# Patient Record
Sex: MALE | Race: WHITE | NOT HISPANIC OR LATINO | Employment: FULL TIME | URBAN - METROPOLITAN AREA
[De-identification: names, ages, dates, MRNs, and addresses within clinical notes are randomized per-mention and may not be internally consistent; named-entity substitution may affect disease eponyms.]

---

## 2024-09-30 NOTE — PROGRESS NOTES
Physical Therapy  Physical Therapy Orthopedic Evaluation    Patient Name: Jeff Gan  MRN: [id number]  Today's Date: 10/1/2024  Time Calculation  Start Time: 0835  Stop Time: 0929  Time Calculation (min): 54 min  PT Evaluation Time Entry  PT Evaluation (Low) Time Entry: 27  PT Therapeutic Procedures Time Entry  Manual Therapy Time Entry: 9  Therapeutic Exercise Time Entry: 17       Insurance:  Payor: [employer] / Plan: [employer] HE[employer] PLAN / Product Type: *No Product type* /   Number of Treatments Authorized: 1/60          Current Problem  Problem List Items Addressed This Visit             ICD-10-CM       Musculoskeletal and Injuries    Acute pain of left shoulder - Primary M25.512    Relevant Orders    Follow Up In Physical Therapy    Pain, neck M54.2        General:  General  Reason for Referral: L shld pain,L neck pain  Referred By: NAIMA Jc  Past Medical History Relevant to Rehab: high cholesterol    Precautions:   Precautions  STEADI Fall Risk Score (The score of 4 or more indicates an increased risk of falling): 2  Precautions Comment: none    Medical History Form: Reviewed (scanned into chart)    Subjective:   Subjective   Chief Complaint: Patient reports he fell playing golf - was walking down steep hill with golf bag - fell with arms out in front. Has had posterior shld pain since then, which moves to front of shld. Now feeling pain that goes up to neck and has pressure/tightness. Denies N/T. Pt reports he's doing everything but gets pinching with lifting arm out to the side. Pinching with going behind the back as well. Slowly getting better. Denies pain with neck motions. Sleeping well. Golfed last week - hurt a little bit worse following.   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       Pain:  Pain Assessment: 0-10  0-10 (Numeric) Pain Score: 4  Pain Type: Acute pain  Pain Location: Neck  Pain Orientation: Left  Pain Radiating Towards: L post shld/scap/ant shld  Pain Descriptors: Aching, Tender, Sharp      Relevant  Information (PMH & Previous Tests/Imaging): X-ray of cervical spine - arthritis; x-ray shld negative     Prior Level of Function (PLOF)  Patient previously independent with all ADLs  Exercise/Physical Activity: golfs  Work/School: desk work at [employer]     Patients Living Environment: Reviewed and no concern    Primary Language: English    Patient's Goal(s) for Therapy: reduce pain, increase strength / flexibility     Personal factors/comorbidities affecting outcomes: none    Red Flags: Do you have any of the following? No  Fever/chills, unexplained weight changes, dizziness/fainting, unexplained change in bowel or bladder functions, unexplained malaise or muscle weakness, night pain/sweats, numbness or tingling    Objective:  Pt presents with moderately FHP with rounded shoulders    Cervical AROM in degrees:  Flexion 53  Extension 60  Lateral flexion (R,L) 30 pain, 20  Rotation (R,L) 68, 63    Shld AROM (degrees)    Flexion (R,L) 160, 132    Ext (R,L) 74, 61    Abduction (R,L) 145, 145    ER behind head (R,L) T3, T2    IR behind back (R,L) T3, L2    Shld MMT:   R Flexion 4+/5      Abduction 5/5      ER at side 5/5      IR at side 5/5      ER at 90* abduction       IR at 90* abduction       Serratus NT      Mid trap NT      Lower trap NT     L Flexion 4/5 pain      Abduction 4/5 pain      ER at side 4/5 pain      IR at side 4+/5      Serratus NT      Mid trap NT      Lower trap NT    (+) tenderness posterior shld, RTC, L UT/lev scap and lateral cervical mms     Sensation = intact (B) UE    Outcome Measures:  Other Measures  Disability of Arm Shoulder Hand (DASH): 29.55  Neck Disability Index: 12%     EDUCATION: Home exercise program, plan of care, activity modifications, pain management, and injury pathology  Outpatient Education  Individual(s) Educated: Patient  Education Provided: Home Exercise Program, Anatomy, POC  Patient/Caregiver Demonstrated Understanding: yes  Plan of Care Discussed and Agreed Upon:  yes  Patient Response to Education: Patient/Caregiver Verbalized Understanding of Information  Education Comment: Access Code: [email or web address]  URL: [email or web address]  Date: 10/01/2024  Prepared by: Tonya Bell    Exercises  - Supine Chin Tuck  - 3 x daily - 7 x weekly - 1 sets - 10 reps - 3 sec hold  - Seated Scapular Retraction  - 2 x daily - 7 x weekly - 1 sets - 10 reps - 2 hold  - Supine Shoulder Flexion Extension Full Range AROM  - 2 x daily - 7 x weekly - 1 sets - 10 reps  - Seated Shoulder External Rotation AAROM with Dowel  - 2 x daily - 7 x weekly - 1-2 sets - 10 reps  - Standing Shoulder Abduction AAROM with Dowel  - 2 x daily - 7 x weekly - 2 sets - 10 reps    Treatment Performed:  Therapeutic Exercise  Therapeutic Exercise Performed: Yes  Therapeutic Exercise Activity 1: Seated posture at work  Therapeutic Exercise Activity 2: HEP performed 1x through  Therapeutic Exercise Activity 3: Cervical retractions - limited - painful to perform - chin tucks without pain    Manual Therapy  Manual Therapy Performed: Yes  Manual Therapy Activity 1: cervical distraction - relief  Manual Therapy Activity 2: lateral cervical mm strumming L, L lev scap/UT release    Assessment: Patient is 49 year old who presents to physical therapy with signs and symptoms consistent with L shoulder pain and subsequent L cervicalgia. Patient has decreased shld ROM with associated pain and strength deficits limiting functional mobility and ADLs. Pt would benefit from skilled physical therapy in order to address the stated deficits and return to daily tasks with reduced pain and improved function.    Clinical Presentation: Stable and/or uncomplicated characteristics  Personal Factors: None    Plan:  Treatment/Interventions: Cryotherapy, Dry needling, Education/ Instruction, Electrical stimulation, Gait training, Hot pack, Manual therapy, Neuromuscular re-education, Self care/ home management, Taping techniques,  Therapeutic activities, Therapeutic exercises, Vasopneumatic device  PT Plan: Skilled PT (response to chin tucks, postural re ed, L shld mobility and strengthening)  PT Frequency: 2 times per week  Duration: 8 weeks, reducing frequency as goals are met  Onset Date: 08/14/24  Number of Treatments Authorized: 1/60  Rehab Potential: Good  Plan of Care Agreement: Patient       Screening  Frequency  Date Last Completed   Spiritual and Cultural Beliefs   Screening each visit or episode of care 10/1/2024   Falls Risk Screening every ambulatory visit [unfilled]   Pain Screening annually at primary care visit     Domestic Violence screening annually at primary care visit    Depression Screening annually in the primary care setting 10/1/2024   Suicide Risk Screening annually in the primary care setting    Nutrition and Food Insecurity   Screening at least annually at primary care visit     Key Learner annually in the primary care setting 10/1/2024   Drug Screen     Alcohol Screen     Advance Directive         Goals: Set and discussed today  Active       L shld pain/cervicalgia       STG/LTG       Start:  10/01/24    Expected End:  11/26/24       STG  1) Patient will improve Quick Dash score by 8% to show an improvement in function in 4 weeks.  2) Patient will show an improvement in shoulder flex/abd to 160 AROM without pain in order to allow for improved ability to perform household chores in 4 weeks.  3) Patient will be able to perform ADLs without pain exceeding 3/10 on the VAS in 4 weeks.  4) Patient will be independent with HEP in 3 visits in order to allow for improved function with home and community tasks.  LTG  1) Patient will improve Quick Dash score to </=15% to show an improvement in function in 8 weeks.  2) Patient will improve scapular and shld strength to 4+/5 in order to reduce compensatory guarding in upper trapezius and greater functional use of upper extremities in 8 weeks.   3) Patient will be able to  perform ADLs without pain exceeding 2/10 on the VAS in 8 weeks.  4) Patient will return to golf/exercise without deficits to improve QOL in 8 weeks.               Plan of care was developed with input and agreement by the patient      Tonya Bell PT

## 2024-10-01 ENCOUNTER — EVALUATION (OUTPATIENT)
Dept: PHYSICAL THERAPY | Facility: CLINIC | Age: 49
End: 2024-10-01
Payer: COMMERCIAL

## 2024-10-01 DIAGNOSIS — M54.2 PAIN, NECK: ICD-10-CM

## 2024-10-01 DIAGNOSIS — M25.512 ACUTE PAIN OF LEFT SHOULDER: Primary | ICD-10-CM

## 2024-10-01 PROCEDURE — 97110 THERAPEUTIC EXERCISES: CPT | Mod: GP | Performed by: PHYSICAL THERAPIST

## 2024-10-01 PROCEDURE — 97140 MANUAL THERAPY 1/> REGIONS: CPT | Mod: GP | Performed by: PHYSICAL THERAPIST

## 2024-10-01 PROCEDURE — 97161 PT EVAL LOW COMPLEX 20 MIN: CPT | Mod: GP | Performed by: PHYSICAL THERAPIST

## 2024-10-01 ASSESSMENT — PAIN - FUNCTIONAL ASSESSMENT: PAIN_FUNCTIONAL_ASSESSMENT: 0-10

## 2024-10-01 ASSESSMENT — ENCOUNTER SYMPTOMS
DEPRESSION: 0
LOSS OF SENSATION IN FEET: 0
OCCASIONAL FEELINGS OF UNSTEADINESS: 0

## 2024-10-01 ASSESSMENT — PAIN DESCRIPTION - DESCRIPTORS: DESCRIPTORS: ACHING;TENDER;SHARP

## 2024-10-01 ASSESSMENT — PATIENT HEALTH QUESTIONNAIRE - PHQ9
2. FEELING DOWN, DEPRESSED OR HOPELESS: NOT AT ALL
1. LITTLE INTEREST OR PLEASURE IN DOING THINGS: NOT AT ALL
SUM OF ALL RESPONSES TO PHQ9 QUESTIONS 1 AND 2: 0

## 2024-10-01 ASSESSMENT — PAIN SCALES - GENERAL: PAINLEVEL_OUTOF10: 4

## 2024-10-07 NOTE — PROGRESS NOTES
Physical Therapy Treatment    Patient Name: Jeff Gan  MRN: [id number]  Today's Date: 10/8/2024  Time Calculation  Start Time: 0920  Stop Time: 0959  Time Calculation (min): 39 min  PT Therapeutic Procedures Time Entry  Manual Therapy Time Entry: 14  Therapeutic Exercise Time Entry: 25       Current Problem  Problem List Items Addressed This Visit             ICD-10-CM       Musculoskeletal and Injuries    Acute pain of left shoulder M25.512    Pain, neck - Primary M54.2       Insurance:  Number of Treatments Authorized: 2/60          Subjective   General  Reason for Referral: L shld pain, L neck pain  Referred By: NAIMA Jc  Past Medical History Relevant to Rehab: high cholesterol  General Comment: Pt reports he feels about the same as IE. Trying to do exercises - doing at least 1x/week.    Performing HEP?: Yes    Precautions  Precautions  STEADI Fall Risk Score (The score of 4 or more indicates an increased risk of falling): 2  Precautions Comment: none  Pain  Pain Assessment: 0-10  0-10 (Numeric) Pain Score: 3  Pain Type: Acute pain  Pain Location: Neck  Pain Orientation: Left  Pain Radiating Towards: L post shld/scap/ant shld  Pain Descriptors: Aching    Objective    (+) L lev scap TP       Treatments:  Therapeutic Exercise  Therapeutic Exercise Activity 1: Seated posture with subjective review; cervical retractions x 10 reps - mild discomfort  Therapeutic Exercise Activity 2: OTD pulleys flexion x 2 minutes  Therapeutic Exercise Activity 3: Seated B bkwd rolls x 1 min  Therapeutic Exercise Activity 4: Seated scap retraction x 10 reps  Therapeutic Exercise Activity 5: OTD pulleys scaption x 2 min  Therapeutic Exercise Activity 6: Seated cervical retractions x 10 reps (Mild headache feeling)  Therapeutic Exercise Activity 7: OTD pulleys abduction x 2 min  Therapeutic Exercise Activity 8: Supine chin tucks x 15 reps    Manual Therapy  Manual Therapy Activity 1: cervical distraction - relief  Manual Therapy  Activity 2: lateral cervical mm strumming L, L lev scap/UT release  Manual Therapy Activity 3: L biceps strumming, delt    OP EDUCATION:  Outpatient Education  Education Comment: Continue current HEP    Assessment:  PT Assessment  Assessment Comment: Pt tolerating tx session well with minimal discomfort with pulleys and exercise regimen. Mild discomfort in neck with seated retractions - pt to continue with supine chin tucks unless able to tolerate seated without increased pain levels. TP along L lev scap reproducing familiar symptoms, improving with STM, warned on post tx soreness.    Plan:  OP PT Plan  Treatment/Interventions: Cryotherapy, Dry needling, Education/ Instruction, Electrical stimulation, Gait training, Hot pack, Manual therapy, Neuromuscular re-education, Self care/ home management, Taping techniques, Therapeutic activities, Therapeutic exercises, Vasopneumatic device  PT Plan: Skilled PT (response to chin tucks, postural re ed, L shld mobility and strengthening)  PT Frequency: 2 times per week  Duration: 8 weeks, reducing frequency as goals are met  Onset Date: 08/14/24  Number of Treatments Authorized: 2/60  Rehab Potential: Good  Plan of Care Agreement: Patient    Goals:  Active       L shld pain/cervicalgia       STG/LTG       Start:  10/01/24    Expected End:  11/26/24       STG  1) Patient will improve Quick Dash score by 8% to show an improvement in function in 4 weeks.  2) Patient will show an improvement in shoulder flex/abd to 160 AROM without pain in order to allow for improved ability to perform household chores in 4 weeks.  3) Patient will be able to perform ADLs without pain exceeding 3/10 on the VAS in 4 weeks.  4) Patient will be independent with HEP in 3 visits in order to allow for improved function with home and community tasks.  LTG  1) Patient will improve Quick Dash score to </=15% to show an improvement in function in 8 weeks.  2) Patient will improve scapular and shld strength  to 4+/5 in order to reduce compensatory guarding in upper trapezius and greater functional use of upper extremities in 8 weeks.   3) Patient will be able to perform ADLs without pain exceeding 2/10 on the VAS in 8 weeks.  4) Patient will return to golf/exercise without deficits to improve QOL in 8 weeks.                Tonya Bell, PT

## 2024-10-08 ENCOUNTER — TREATMENT (OUTPATIENT)
Dept: PHYSICAL THERAPY | Facility: CLINIC | Age: 49
End: 2024-10-08
Payer: COMMERCIAL

## 2024-10-08 DIAGNOSIS — M54.2 PAIN, NECK: Primary | ICD-10-CM

## 2024-10-08 DIAGNOSIS — M25.512 ACUTE PAIN OF LEFT SHOULDER: ICD-10-CM

## 2024-10-08 PROCEDURE — 97110 THERAPEUTIC EXERCISES: CPT | Mod: GP | Performed by: PHYSICAL THERAPIST

## 2024-10-08 PROCEDURE — 97140 MANUAL THERAPY 1/> REGIONS: CPT | Mod: GP | Performed by: PHYSICAL THERAPIST

## 2024-10-08 ASSESSMENT — PAIN SCALES - GENERAL: PAINLEVEL_OUTOF10: 3

## 2024-10-08 ASSESSMENT — PAIN - FUNCTIONAL ASSESSMENT: PAIN_FUNCTIONAL_ASSESSMENT: 0-10

## 2024-10-08 ASSESSMENT — PAIN DESCRIPTION - DESCRIPTORS: DESCRIPTORS: ACHING

## 2024-10-11 ENCOUNTER — TREATMENT (OUTPATIENT)
Dept: PHYSICAL THERAPY | Facility: CLINIC | Age: 49
End: 2024-10-11
Payer: COMMERCIAL

## 2024-10-11 DIAGNOSIS — M25.512 ACUTE PAIN OF LEFT SHOULDER: ICD-10-CM

## 2024-10-11 PROCEDURE — 97110 THERAPEUTIC EXERCISES: CPT | Mod: GP | Performed by: PHYSICAL THERAPIST

## 2024-10-11 PROCEDURE — 97140 MANUAL THERAPY 1/> REGIONS: CPT | Mod: GP | Performed by: PHYSICAL THERAPIST

## 2024-10-11 ASSESSMENT — PAIN SCALES - GENERAL: PAINLEVEL_OUTOF10: 2

## 2024-10-11 ASSESSMENT — PAIN - FUNCTIONAL ASSESSMENT: PAIN_FUNCTIONAL_ASSESSMENT: 0-10

## 2024-10-11 NOTE — PROGRESS NOTES
"  Physical Therapy Treatment    Patient Name: Jeff Gan  MRN: [id number]  Today's Date: 10/11/2024  Time Calculation  Start Time: 0900  Stop Time: 0944  Time Calculation (min): 44 min  PT Therapeutic Procedures Time Entry  Manual Therapy Time Entry: 13  Therapeutic Exercise Time Entry: 30       Current Problem  Problem List Items Addressed This Visit             ICD-10-CM       Musculoskeletal and Injuries    Acute pain of left shoulder M25.512       Insurance:  Number of Treatments Authorized: 3/60          Subjective   General  Reason for Referral: L shld pain, L neck pain  Referred By: NAIMA Jc  Past Medical History Relevant to Rehab: high cholesterol  General Comment: Pt reports he's feeling about the same still with his neck/shld, did exercises 1x yesterday. Is noted some LBP to R radiating pain from laying on the ground with kitten.    Performing HEP?: Yes    Precautions  Precautions  STEADI Fall Risk Score (The score of 4 or more indicates an increased risk of falling): 2  Precautions Comment: none  Pain  Pain Assessment: 0-10  0-10 (Numeric) Pain Score: 2  Pain Type: Acute pain  Pain Location: Neck  Pain Orientation: Left    Objective    Minimal loss into retraction (improved from last session)  (+) TP at L biceps      Treatments:  Therapeutic Exercise  Therapeutic Exercise Activity 1: Seated lumbar roll x 3 minutes  Therapeutic Exercise Activity 2: OTD pulleys flexion x 2 minutes  Therapeutic Exercise Activity 3: Seated scap retraction x 10 reps  Therapeutic Exercise Activity 4: Shld rolls painful x 5 - held  Therapeutic Exercise Activity 5: OTD pulleys scaption x 2 min  Therapeutic Exercise Activity 6: Seated cervical retractions x 1 min (educated on HEP)  Therapeutic Exercise Activity 7: Seated bkwd shld rolls x 10  Therapeutic Exercise Activity 8: OTD pulleys abduction x 2 min  Therapeutic Exercise Activity 9: L shld ER/IR isos at wall 3\" x 1 min each  Therapeutic Exercise Activity 10: Supine chin " tucks x 1 min  Therapeutic Exercise Activity 11: Supine L shld AROM flexion x 1 min    Manual Therapy  Manual Therapy Activity 1: cervical distraction with SOR  Manual Therapy Activity 2: lateral cervical mm strumming L, L lev scap/UT release  Manual Therapy Activity 3: L biceps strumming, delt    OP EDUCATION:  Outpatient Education  Education Comment: Access Code: [email or web address]  URL: [email or web address]  Date: 10/11/2024  Prepared by: Tonya Bell    Exercises  - Seated Cervical Retraction  - 5 x daily - 7 x weekly - 1 sets - 10 reps  - Supine Cervical Retraction with Towel  - 2 x daily - 7 x weekly - 1 sets - 10 reps  - Isometric Shoulder External Rotation at Wall  - 2 x daily - 7 x weekly - 1 sets - 10 reps - 3-5 sec hold  - Standing Isometric Shoulder Internal Rotation at Doorway  - 2 x daily - 7 x weekly - 1 sets - 10 reps - 3-5 sec hold  - Supine Shoulder Flexion Extension Full Range AROM  - 2 x daily - 7 x weekly - 2 sets - 10 reps    Assessment:  PT Assessment  Assessment Comment: Pt with slow initial progress under POC subjectively - however is showing improved neck and shld mobility. Coaxed for increasing reps for cervical retractions along with initiating shld isos and holding ER/abd AAROM at this time. Also educated on lumbar roll with good understanding. Pt with pain relief following manual - warned on post tx soreness.    Plan:  OP PT Plan  Treatment/Interventions: Cryotherapy, Dry needling, Education/ Instruction, Electrical stimulation, Gait training, Hot pack, Manual therapy, Neuromuscular re-education, Self care/ home management, Taping techniques, Therapeutic activities, Therapeutic exercises, Vasopneumatic device  PT Plan: Skilled PT (response to chin tucks, postural re ed, L shld mobility and strengthening)  PT Frequency: 2 times per week  Duration: 8 weeks, reducing frequency as goals are met  Onset Date: 08/14/24  Number of Treatments Authorized: 3/60  Rehab Potential:  Good  Plan of Care Agreement: Patient    Goals:  Active       L shld pain/cervicalgia       STG/LTG       Start:  10/01/24    Expected End:  11/26/24       STG  1) Patient will improve Quick Dash score by 8% to show an improvement in function in 4 weeks.  2) Patient will show an improvement in shoulder flex/abd to 160 AROM without pain in order to allow for improved ability to perform household chores in 4 weeks.  3) Patient will be able to perform ADLs without pain exceeding 3/10 on the VAS in 4 weeks.  4) Patient will be independent with HEP in 3 visits in order to allow for improved function with home and community tasks.  LTG  1) Patient will improve Quick Dash score to </=15% to show an improvement in function in 8 weeks.  2) Patient will improve scapular and shld strength to 4+/5 in order to reduce compensatory guarding in upper trapezius and greater functional use of upper extremities in 8 weeks.   3) Patient will be able to perform ADLs without pain exceeding 2/10 on the VAS in 8 weeks.  4) Patient will return to golf/exercise without deficits to improve QOL in 8 weeks.                Tonya Bell, PT

## 2024-10-15 ENCOUNTER — TREATMENT (OUTPATIENT)
Dept: PHYSICAL THERAPY | Facility: CLINIC | Age: 49
End: 2024-10-15
Payer: COMMERCIAL

## 2024-10-15 DIAGNOSIS — M25.512 ACUTE PAIN OF LEFT SHOULDER: ICD-10-CM

## 2024-10-15 PROCEDURE — 97110 THERAPEUTIC EXERCISES: CPT | Mod: GP | Performed by: PHYSICAL THERAPIST

## 2024-10-15 PROCEDURE — 97140 MANUAL THERAPY 1/> REGIONS: CPT | Mod: GP | Performed by: PHYSICAL THERAPIST

## 2024-10-15 ASSESSMENT — PAIN - FUNCTIONAL ASSESSMENT: PAIN_FUNCTIONAL_ASSESSMENT: 0-10

## 2024-10-15 ASSESSMENT — PAIN SCALES - GENERAL: PAINLEVEL_OUTOF10: 3

## 2024-10-15 NOTE — PROGRESS NOTES
Physical Therapy Treatment    Patient Name: Jeff Gan  MRN: [id number]  Today's Date: 10/15/2024  Time Calculation  Start Time: 1230  Stop Time: 1313  Time Calculation (min): 43 min  PT Therapeutic Procedures Time Entry  Manual Therapy Time Entry: 12  Therapeutic Exercise Time Entry: 30       Current Problem  Problem List Items Addressed This Visit             ICD-10-CM       Musculoskeletal and Injuries    Acute pain of left shoulder M25.512       Insurance:  Number of Treatments Authorized: 4/60          Subjective   General  Reason for Referral: L shld pain, L sided neck pain  Referred By: NAIMA Jc  Past Medical History Relevant to Rehab: high cholesterol  General Comment: Pt reports he thinks he's feeling a little bit better. Did get sharp pain in shoulder with lifting arm out to the side quickly. Neck seems like it's getting a little better.    Performing HEP?: Yes    Precautions  Precautions  STEADI Fall Risk Score (The score of 4 or more indicates an increased risk of falling): 2  Precautions Comment: none  Pain  Pain Assessment: 0-10  0-10 (Numeric) Pain Score: 3  Pain Type: Acute pain  Pain Location: Shoulder  Pain Orientation: Left    Objective    OTD pulleys full L shoulder mobility overhead      Treatments:  Therapeutic Exercise  Therapeutic Exercise Activity 1: Seated with subjective  Therapeutic Exercise Activity 2: OTD pulleys flexion x 2 minutes  Therapeutic Exercise Activity 3: Seated L shoulder AROM flexion x 1 min  Therapeutic Exercise Activity 4: B shoulder ER palms up x 1 min seated  Therapeutic Exercise Activity 5: OTD pulleys scaption x 2 min  Therapeutic Exercise Activity 6: Seated cervical retractions x 1 min  Therapeutic Exercise Activity 7: B shld flexion wall slides x 1 min  Therapeutic Exercise Activity 8: L shld scaption wall slides x 5 reps - painful, R UE pain free  Therapeutic Exercise Activity 9: B rows yellow perform better band x 1 min  Therapeutic Exercise Activity 10:  R/L alt shld extension yellow perform better band x 1 min  Therapeutic Exercise Activity 11: B mid rows to chest yellow perform better band x 1 min  Therapeutic Exercise Activity 12: R/L shld flexion crawl up ball red swiss ball with stretch x 1 min    Manual Therapy  Manual Therapy Activity 1: cervical distraction with SOR  Manual Therapy Activity 2: lateral cervical mm strumming L, L lev scap/UT release  Manual Therapy Activity 3: L biceps strumming, delt    OP EDUCATION:  Outpatient Education  Education Comment: Current HEP - bands next visit if able    Assessment:  PT Assessment  Assessment Comment: Pt showing good progress with L shld mobility with slightly reduced pain this date. Greater tolerance to exercise with ability to add resisted banded strength without an increase in pain. Tenderness persists at L UT and biceps region, improving with STM. Warned on post tx soreness.    Plan:  OP PT Plan  Treatment/Interventions: Cryotherapy, Dry needling, Education/ Instruction, Electrical stimulation, Gait training, Hot pack, Manual therapy, Neuromuscular re-education, Self care/ home management, Taping techniques, Therapeutic activities, Therapeutic exercises, Vasopneumatic device  PT Plan: Skilled PT (response to chin tucks, postural re ed, L shld mobility and scap strengthening)  PT Frequency: 2 times per week  Duration: 8 weeks, reducing frequency as goals are met  Onset Date: 08/14/24  Number of Treatments Authorized: 4/60  Rehab Potential: Good  Plan of Care Agreement: Patient    Goals:  Active       L shld pain/cervicalgia       STG/LTG       Start:  10/01/24    Expected End:  11/26/24       STG  1) Patient will improve Quick Dash score by 8% to show an improvement in function in 4 weeks.  2) Patient will show an improvement in shoulder flex/abd to 160 AROM without pain in order to allow for improved ability to perform household chores in 4 weeks.  3) Patient will be able to perform ADLs without pain exceeding  3/10 on the VAS in 4 weeks.  4) Patient will be independent with HEP in 3 visits in order to allow for improved function with home and community tasks.  LTG  1) Patient will improve Quick Dash score to </=15% to show an improvement in function in 8 weeks.  2) Patient will improve scapular and shld strength to 4+/5 in order to reduce compensatory guarding in upper trapezius and greater functional use of upper extremities in 8 weeks.   3) Patient will be able to perform ADLs without pain exceeding 2/10 on the VAS in 8 weeks.  4) Patient will return to golf/exercise without deficits to improve QOL in 8 weeks.                Tonya Bell, PT

## 2024-10-18 ENCOUNTER — TREATMENT (OUTPATIENT)
Dept: PHYSICAL THERAPY | Facility: CLINIC | Age: 49
End: 2024-10-18
Payer: COMMERCIAL

## 2024-10-18 DIAGNOSIS — M25.512 ACUTE PAIN OF LEFT SHOULDER: ICD-10-CM

## 2024-10-18 DIAGNOSIS — M54.2 PAIN, NECK: Primary | ICD-10-CM

## 2024-10-18 PROCEDURE — 97140 MANUAL THERAPY 1/> REGIONS: CPT | Mod: GP | Performed by: PHYSICAL THERAPIST

## 2024-10-18 PROCEDURE — 97110 THERAPEUTIC EXERCISES: CPT | Mod: GP | Performed by: PHYSICAL THERAPIST

## 2024-10-18 ASSESSMENT — PAIN SCALES - GENERAL: PAINLEVEL_OUTOF10: 2

## 2024-10-18 ASSESSMENT — PAIN - FUNCTIONAL ASSESSMENT: PAIN_FUNCTIONAL_ASSESSMENT: 0-10

## 2024-10-18 NOTE — PROGRESS NOTES
Physical Therapy Treatment    Patient Name: Jeff Gan  MRN: [id number]  Today's Date: 10/18/2024  Time Calculation  Start Time: 0901  Stop Time: 0948  Time Calculation (min): 47 min  PT Therapeutic Procedures Time Entry  Manual Therapy Time Entry: 9  Therapeutic Exercise Time Entry: 36       Current Problem  Problem List Items Addressed This Visit             ICD-10-CM       Musculoskeletal and Injuries    Acute pain of left shoulder M25.512    Pain, neck - Primary M54.2       Insurance:  Number of Treatments Authorized: 5/60          Subjective   General  Reason for Referral: L shld pain, L sided neck pain  Referred By: NAIMA Jc  Past Medical History Relevant to Rehab: high cholesterol  General Comment: Pt reports he's doing a little better overall. Neck has been feeling less tense. Notes he's still getting sharp pains when lifting L shoulder quickly still. Did do some shld presses with light weights and shld abd yesterday.    Performing HEP?: Yes    Precautions  Precautions  STEADI Fall Risk Score (The score of 4 or more indicates an increased risk of falling): 2  Precautions Comment: none  Pain  Pain Assessment: 0-10  0-10 (Numeric) Pain Score: 2  Pain Type: Acute pain  Pain Location: Shoulder  Pain Orientation: Left    Objective    L shld flexion 144  L IR behind back L2    Treatments:  Therapeutic Exercise  Therapeutic Exercise Activity 1: Seated with subjective  Therapeutic Exercise Activity 2: SciFit UBE 2'/2' F/B x 4 minutes  Therapeutic Exercise Activity 3: Seated thoracic ext over chair x 1 min  Therapeutic Exercise Activity 4: Doorway stretch low 30 sec x 2 (pain with arms higher - held)  Therapeutic Exercise Activity 5: B shld flexion wall slides x 1 min  Therapeutic Exercise Activity 6: B shld scaption to 90* x 10 reps  Therapeutic Exercise Activity 7: L shld abduction x 10 reps  Therapeutic Exercise Activity 8: L shld flexion x 5 reps  Therapeutic Exercise Activity 9: L lev scap stretch  "standing 15\" x 3 reps  Therapeutic Exercise Activity 10: L UT stretch 10\" x 2  Therapeutic Exercise Activity 11: B scap retraction with ER x 1 min  Therapeutic Exercise Activity 12: B rows red tband x 1 min  Therapeutic Exercise Activity 13: R/L alt shld extension red tband x 1 min    Manual Therapy  Manual Therapy Activity 1: lateral cervical mm strumming L, L lev scap/UT release  Manual Therapy Activity 2: L biceps strumming, delt  Manual Therapy Activity 3: Cervical distraction, L GHJ mobs grade II/III    OP EDUCATION:  Outpatient Education  Education Comment: Access Code: [email or web address]  URL: [email or web address]  Date: 10/18/2024  Prepared by: Tonya Bell    Exercises  - Standing Shoulder Row with Anchored Resistance  - 1 x daily - 3-5 x weekly - 2 sets - 10 reps  - Shoulder extension with resistance - Neutral  - 1 x daily - 3-5 x weekly - 2 sets - 10 reps  - Shoulder External Rotation and Scapular Retraction  - 1 x daily - 3-5 x weekly - 2 sets - 10 reps    Assessment:  PT Assessment  Assessment Comment: Pt showing good progress under POC with improved shoulder mobility with lesser amts of pain, however increased education needed on healing process, anatomy with elevation with shld ER (thumb up) and mvmts to avoid. Able to add banded strength this date without an increase in symptoms, updated and added to HEP. Pt also educated heavily on reducing UT work with shld exercises with good understanding. Relief with STM primarily in neck region.    Plan:  OP PT Plan  Treatment/Interventions: Cryotherapy, Dry needling, Education/ Instruction, Electrical stimulation, Gait training, Hot pack, Manual therapy, Neuromuscular re-education, Self care/ home management, Taping techniques, Therapeutic activities, Therapeutic exercises, Vasopneumatic device  PT Plan: Skilled PT (response to chin tucks, postural re ed, L shld mobility and scap strengthening)  PT Frequency: 2 times per week  Duration: 8 weeks, " reducing frequency as goals are met  Onset Date: 08/14/24  Number of Treatments Authorized: 5/60  Rehab Potential: Good  Plan of Care Agreement: Patient    Goals:  Active       L shld pain/cervicalgia       STG/LTG (Progressing)       Start:  10/01/24    Expected End:  11/26/24       STG  1) Patient will improve Quick Dash score by 8% to show an improvement in function in 4 weeks.  2) Patient will show an improvement in shoulder flex/abd to 160 AROM without pain in order to allow for improved ability to perform household chores in 4 weeks.  3) Patient will be able to perform ADLs without pain exceeding 3/10 on the VAS in 4 weeks.  4) Patient will be independent with HEP in 3 visits in order to allow for improved function with home and community tasks.  LTG  1) Patient will improve Quick Dash score to </=15% to show an improvement in function in 8 weeks.  2) Patient will improve scapular and shld strength to 4+/5 in order to reduce compensatory guarding in upper trapezius and greater functional use of upper extremities in 8 weeks.   3) Patient will be able to perform ADLs without pain exceeding 2/10 on the VAS in 8 weeks.  4) Patient will return to golf/exercise without deficits to improve QOL in 8 weeks.                Tonya Bell, PT

## 2024-10-22 ENCOUNTER — TREATMENT (OUTPATIENT)
Dept: PHYSICAL THERAPY | Facility: CLINIC | Age: 49
End: 2024-10-22
Payer: COMMERCIAL

## 2024-10-22 DIAGNOSIS — M25.512 ACUTE PAIN OF LEFT SHOULDER: Primary | ICD-10-CM

## 2024-10-22 DIAGNOSIS — M54.2 PAIN, NECK: ICD-10-CM

## 2024-10-22 PROCEDURE — 97110 THERAPEUTIC EXERCISES: CPT | Mod: GP,CQ

## 2024-10-22 PROCEDURE — 97140 MANUAL THERAPY 1/> REGIONS: CPT | Mod: GP,CQ

## 2024-10-22 ASSESSMENT — PAIN SCALES - GENERAL: PAINLEVEL_OUTOF10: 3

## 2024-10-22 ASSESSMENT — PAIN - FUNCTIONAL ASSESSMENT: PAIN_FUNCTIONAL_ASSESSMENT: 0-10

## 2024-10-22 NOTE — PROGRESS NOTES
"  Physical Therapy Treatment    Patient Name: Jeff Gan  MRN: [id number]  Today's Date: 10/22/2024  Time Calculation  Start Time: 0830  Stop Time: 0917  Time Calculation (min): 47 min  PT Therapeutic Procedures Time Entry  Manual Therapy Time Entry: 15  Therapeutic Exercise Time Entry: 32,      Current Problem  Problem List Items Addressed This Visit             ICD-10-CM    Acute pain of left shoulder - Primary M25.512    Pain, neck M54.2       Insurance:  Number of Treatments Authorized: 6/60            Subjective   General  Reason for Referral: L shld pain, L sided neck pain  Referred By: NAIMA Jc  Past Medical History Relevant to Rehab: high cholesterol  General Comment: Sunday was the best day he's had in awhile.  Yesterday he was doing well, until the end of the day when he tried to put on his shirt \"normally\".  Felt a little tweak and has some mild soreness this morning.  Neck if getting looser.    Performing HEP?: Yes    Precautions  Precautions  STEADI Fall Risk Score (The score of 4 or more indicates an increased risk of falling): 2  Precautions Comment: none  Pain  Pain Assessment: 0-10  0-10 (Numeric) Pain Score: 3  Pain Type: Acute pain  Pain Location: Shoulder  Pain Orientation: Left    Objective       Treatments:    Therapeutic Exercise  Therapeutic Exercise Activity 1: SciFit UBE 2'/2' F/B x 4 minutes  Therapeutic Exercise Activity 2: thoracic extension over chair with hands clasped x 10  Therapeutic Exercise Activity 3: Doorway stretch low 30 sec x 2 (pain with arms higher - held)  Therapeutic Exercise Activity 4: B shld flexion wall slides x 1 min  Therapeutic Exercise Activity 5: retro shld rolls x 10  Therapeutic Exercise Activity 6: scap add with PVC assisted B shld ext x 1 min  Therapeutic Exercise Activity 7: posture on the wall - B ER x 1 min  Therapeutic Exercise Activity 8: PVC assisted scaption x 1 min  Therapeutic Exercise Activity 9: RTB resisted reactive ER 2 step x " 10  Therapeutic Exercise Activity 10: RTB resisted reactive IR 2 step x 10  Therapeutic Exercise Activity 11: supine RTB H ABD x 1 min         Manual Therapy  Manual Therapy Activity 1: STM/DSTM L UT, LEV, med scap boarder, infra, pecs, delt, bicep  Manual Therapy Activity 2: SOR, manual cervical distraction  Manual Therapy Activity 3: stretch L UT, LEV, pecs                     OP EDUCATION:       Assessment:  PT Assessment  Assessment Comment: Patient tolerated today's session well without the pinching pain in his shoulder.  He was pleased with his ability to tolerate some of the new exercises.  Good relief with manual activities.    Plan:  OP PT Plan  Treatment/Interventions: Cryotherapy, Dry needling, Education/ Instruction, Electrical stimulation, Gait training, Hot pack, Manual therapy, Neuromuscular re-education, Self care/ home management, Taping techniques, Therapeutic activities, Therapeutic exercises, Vasopneumatic device  PT Plan: Skilled PT (response to chin tucks, postural re ed, L shld mobility and scap strengthening)  PT Frequency: 2 times per week  Duration: 8 weeks, reducing frequency as goals are met  Onset Date: 08/14/24  Number of Treatments Authorized: 6/60  Rehab Potential: Good  Plan of Care Agreement: Patient    Goals:  Active       L shld pain/cervicalgia       STG/LTG (Progressing)       Start:  10/01/24    Expected End:  11/26/24       STG  1) Patient will improve Quick Dash score by 8% to show an improvement in function in 4 weeks.  2) Patient will show an improvement in shoulder flex/abd to 160 AROM without pain in order to allow for improved ability to perform household chores in 4 weeks.  3) Patient will be able to perform ADLs without pain exceeding 3/10 on the VAS in 4 weeks.  4) Patient will be independent with HEP in 3 visits in order to allow for improved function with home and community tasks.  LTG  1) Patient will improve Quick Dash score to </=15% to show an improvement in  function in 8 weeks.  2) Patient will improve scapular and shld strength to 4+/5 in order to reduce compensatory guarding in upper trapezius and greater functional use of upper extremities in 8 weeks.   3) Patient will be able to perform ADLs without pain exceeding 2/10 on the VAS in 8 weeks.  4) Patient will return to golf/exercise without deficits to improve QOL in 8 weeks.                Marimar Perez, PTA

## 2024-10-29 ENCOUNTER — TREATMENT (OUTPATIENT)
Dept: PHYSICAL THERAPY | Facility: CLINIC | Age: 49
End: 2024-10-29
Payer: COMMERCIAL

## 2024-10-29 DIAGNOSIS — M25.512 ACUTE PAIN OF LEFT SHOULDER: Primary | ICD-10-CM

## 2024-10-29 DIAGNOSIS — M54.2 PAIN, NECK: ICD-10-CM

## 2024-10-29 PROCEDURE — 97110 THERAPEUTIC EXERCISES: CPT | Mod: GP | Performed by: PHYSICAL THERAPIST

## 2024-10-29 PROCEDURE — 97140 MANUAL THERAPY 1/> REGIONS: CPT | Mod: GP | Performed by: PHYSICAL THERAPIST

## 2024-10-29 ASSESSMENT — PAIN - FUNCTIONAL ASSESSMENT: PAIN_FUNCTIONAL_ASSESSMENT: 0-10

## 2024-10-29 ASSESSMENT — PAIN SCALES - GENERAL: PAINLEVEL_OUTOF10: 2

## 2024-11-05 ENCOUNTER — TREATMENT (OUTPATIENT)
Dept: PHYSICAL THERAPY | Facility: CLINIC | Age: 49
End: 2024-11-05
Payer: COMMERCIAL

## 2024-11-05 DIAGNOSIS — M25.512 ACUTE PAIN OF LEFT SHOULDER: ICD-10-CM

## 2024-11-05 PROCEDURE — 97110 THERAPEUTIC EXERCISES: CPT | Mod: GP | Performed by: PHYSICAL THERAPIST

## 2024-11-05 PROCEDURE — 97140 MANUAL THERAPY 1/> REGIONS: CPT | Mod: GP | Performed by: PHYSICAL THERAPIST

## 2024-11-05 ASSESSMENT — PAIN - FUNCTIONAL ASSESSMENT: PAIN_FUNCTIONAL_ASSESSMENT: 0-10

## 2024-11-05 ASSESSMENT — PAIN SCALES - GENERAL: PAINLEVEL_OUTOF10: 1

## 2024-11-05 NOTE — PROGRESS NOTES
Physical Therapy Treatment    Patient Name: Jeff Gan  MRN: [id number]  Today's Date: 11/5/2024  Time Calculation  Start Time: 0830  Stop Time: 0918  Time Calculation (min): 48 min  PT Therapeutic Procedures Time Entry  Manual Therapy Time Entry: 13  Therapeutic Exercise Time Entry: 34       Current Problem  Problem List Items Addressed This Visit             ICD-10-CM       Musculoskeletal and Injuries    Acute pain of left shoulder M25.512       Insurance:  Number of Treatments Authorized: 8/60          Subjective   General  Reason for Referral: L shld pain, L sided neck pain  Referred By: NAIMA Jc  Past Medical History Relevant to Rehab: high cholesterol  General Comment: Pt reports he feels better in the shoulder - some more stiffness in the neck but has been taking down wall paper in the bathroom. Less sharp pains and has been able to do a little more.    Performing HEP?: Yes    Precautions  Precautions  STEADI Fall Risk Score (The score of 4 or more indicates an increased risk of falling): 2  Precautions Comment: none  Pain  Pain Assessment: 0-10  0-10 (Numeric) Pain Score: 1  Pain Type: Acute pain  Pain Location: Shoulder  Pain Orientation: Left    Objective    L UT activation with L shoulder scaption + added weight - held  Good motion without weight       Treatments:  Therapeutic Exercise  Therapeutic Exercise Activity 1: SciFit UBE 3'/3' F/B x 4 minutes  Therapeutic Exercise Activity 2: R/L shld flexion crawl up wall red swiss ball x 1 min  Therapeutic Exercise Activity 3: L scaption wall slide x 1 min  Therapeutic Exercise Activity 4: L CW/CCW wall slides 30 sec each dir  Therapeutic Exercise Activity 5: B scaption to 90* x 1 min  Therapeutic Exercise Activity 6: R/L scaption 3# - cues for reduced UT firing  Therapeutic Exercise Activity 7: Matrix rows 12.5# each x 10 reps  Therapeutic Exercise Activity 8: Matrix R/L shld ext 7.5# each x 1 min  Therapeutic Exercise Activity 9: Matrix 2.5# B higher  rows x 1 min  Therapeutic Exercise Activity 10: Matrix 2.5# L IR/ER x 1 min each    Manual Therapy  Manual Therapy Activity 1: STM/DSTM L UT, LEV, med scap boarder, infra, pecs, delt, bicep  Manual Therapy Activity 2: SOR, manual cervical distraction    OP EDUCATION:  Outpatient Education  Education Comment: Access Code: [email or web address]  URL: [email or web address]  Date: 11/05/2024  Prepared by: Tonya Bell    Exercises  - Seated Cervical Retraction  - 2 x daily - 7 x weekly - 1 sets - 10 reps  - Standing Shoulder Scaption  - 1 x daily - 7 x weekly - 2 sets - 10 reps  - Standing Shoulder Row with Anchored Resistance  - 1 x daily - 3-5 x weekly - 2 sets - 10 reps  - Shoulder extension with resistance - Neutral  - 1 x daily - 3-5 x weekly - 2 sets - 10 reps  - Shoulder External Rotation with Anchored Resistance  - 1 x daily - 3-5 x weekly - 2 sets - 10 reps  - Shoulder Internal Rotation with Resistance  - 1 x daily - 3-5 x weekly - 2 sets - 10 reps  - Shoulder External Rotation and Scapular Retraction  - 1 x daily - 3-5 x weekly - 2 sets - 10 reps    Assessment:  PT Assessment  Assessment Comment: Pt progressing well under POC with improved shoulder mobility and less pain. Able to progress well with shoulder strengthening exercises with fatigue present - warned on post tx soreness. Challenged with shoulder ER and scaption exercises. Soft tissue restrictions in cervical region and L scap region, improving with STM.    Plan:  OP PT Plan  Treatment/Interventions: Cryotherapy, Dry needling, Education/ Instruction, Electrical stimulation, Gait training, Hot pack, Manual therapy, Neuromuscular re-education, Self care/ home management, Taping techniques, Therapeutic activities, Therapeutic exercises, Vasopneumatic device  PT Plan: Skilled PT (cervical retractions, postural re ed, L shld mobility and scap strengthening ER/abd focus)  PT Frequency: 2 times per week  Duration: 8 weeks, reducing frequency as  goals are met  Onset Date: 08/14/24  Number of Treatments Authorized: 8/60  Rehab Potential: Good  Plan of Care Agreement: Patient    Goals:  Active       L shld pain/cervicalgia       STG/LTG (Progressing)       Start:  10/01/24    Expected End:  11/26/24       STG  1) Patient will improve Quick Dash score by 8% to show an improvement in function in 4 weeks.  2) Patient will show an improvement in shoulder flex/abd to 160 AROM without pain in order to allow for improved ability to perform household chores in 4 weeks.  3) Patient will be able to perform ADLs without pain exceeding 3/10 on the VAS in 4 weeks.  4) Patient will be independent with HEP in 3 visits in order to allow for improved function with home and community tasks.  LTG  1) Patient will improve Quick Dash score to </=15% to show an improvement in function in 8 weeks.  2) Patient will improve scapular and shld strength to 4+/5 in order to reduce compensatory guarding in upper trapezius and greater functional use of upper extremities in 8 weeks.   3) Patient will be able to perform ADLs without pain exceeding 2/10 on the VAS in 8 weeks.  4) Patient will return to golf/exercise without deficits to improve QOL in 8 weeks.                Tonya Bell, PT

## 2024-11-12 ENCOUNTER — TREATMENT (OUTPATIENT)
Dept: PHYSICAL THERAPY | Facility: CLINIC | Age: 49
End: 2024-11-12
Payer: COMMERCIAL

## 2024-11-12 DIAGNOSIS — M25.512 ACUTE PAIN OF LEFT SHOULDER: ICD-10-CM

## 2024-11-12 PROCEDURE — 97140 MANUAL THERAPY 1/> REGIONS: CPT | Mod: GP | Performed by: PHYSICAL THERAPIST

## 2024-11-12 PROCEDURE — 97110 THERAPEUTIC EXERCISES: CPT | Mod: GP | Performed by: PHYSICAL THERAPIST

## 2024-11-12 ASSESSMENT — PAIN SCALES - GENERAL: PAINLEVEL_OUTOF10: 1

## 2024-11-12 ASSESSMENT — PAIN - FUNCTIONAL ASSESSMENT: PAIN_FUNCTIONAL_ASSESSMENT: 0-10

## 2024-11-12 NOTE — PROGRESS NOTES
Physical Therapy Treatment    Patient Name: Jeff Gan  MRN: [id number]  Today's Date: 11/12/2024  Time Calculation  Start Time: 0834  Stop Time: 0918  Time Calculation (min): 44 min  PT Therapeutic Procedures Time Entry  Manual Therapy Time Entry: 15  Therapeutic Exercise Time Entry: 28       Current Problem  Problem List Items Addressed This Visit             ICD-10-CM       Musculoskeletal and Injuries    Acute pain of left shoulder M25.512       Insurance:  Number of Treatments Authorized: 9/60          Subjective   General  Reason for Referral: L shld pain, L sided neck pain  Referred By: NAIMA Jc  Past Medical History Relevant to Rehab: high cholesterol  General Comment: Pt reports he's been sore over the last week. Doing all banded exercises. Discomfort in his neck intermittent.    Performing HEP?: Yes    Precautions  Precautions  STEADI Fall Risk Score (The score of 4 or more indicates an increased risk of falling): 2  Precautions Comment: none  Pain  Pain Assessment: 0-10  0-10 (Numeric) Pain Score: 1  Pain Type: Acute pain  Pain Location: Shoulder  Pain Orientation: Left    Objective    R shoulder AROM flexion 160*     Treatments:  Therapeutic Exercise  Therapeutic Exercise Activity 1: SciFit UBE 3'/3' F/B x 6 minutes  Therapeutic Exercise Activity 2: thoracic extension over chair with hands clasped x 1 min  Therapeutic Exercise Activity 3: B shld flexion wall slides x 1 min  Therapeutic Exercise Activity 4: L scaption wall slide x 1 min  Therapeutic Exercise Activity 5: L shld abduction to 90* x 1 min  Therapeutic Exercise Activity 6: R,L thoracic rotation at wall 90* x 1 min each  Therapeutic Exercise Activity 7: L shld taps at wall with purple ball x 1 min  Therapeutic Exercise Activity 8: L shld IR AROM palm back to behind back x 1 min  Therapeutic Exercise Activity 9: L shld PROM x 10 reps  Therapeutic Exercise Activity 10: *exercise review - neck relaxation    Manual Therapy  Manual Therapy  Activity 1: STM/DSTM L UT, LEV, med scap boarder, infra, pecs, delt, bicep  Manual Therapy Activity 2: SOR, manual cervical distraction    OP EDUCATION:  Outpatient Education  Education Comment: AROM shld IR behind back sweeps    Assessment:  PT Assessment  Assessment Comment: Pt continues to show good progress under POC, however soreness present from incorporating more strength training. Overall L shld mobility has improved. Challenged with L IR behind back with most limitation present. TP along L lev scap/UT mms, improving with STM, warned on post tx soreness.    Plan:  OP PT Plan  Treatment/Interventions: Cryotherapy, Dry needling, Education/ Instruction, Electrical stimulation, Gait training, Hot pack, Manual therapy, Neuromuscular re-education, Self care/ home management, Taping techniques, Therapeutic activities, Therapeutic exercises, Vasopneumatic device  PT Plan: Skilled PT (cervical retractions, postural re ed, L shld mobility and scap strengthening ER/abd focus)  PT Frequency: 1 time per week (*update next visit)  Duration: 8 weeks, reducing frequency as goals are met  Onset Date: 08/14/24  Number of Treatments Authorized: 9/60  Rehab Potential: Good  Plan of Care Agreement: Patient    Goals:  Active       L shld pain/cervicalgia       STG/LTG (Progressing)       Start:  10/01/24    Expected End:  11/26/24       STG  1) Patient will improve Quick Dash score by 8% to show an improvement in function in 4 weeks.  2) Patient will show an improvement in shoulder flex/abd to 160 AROM without pain in order to allow for improved ability to perform household chores in 4 weeks.  3) Patient will be able to perform ADLs without pain exceeding 3/10 on the VAS in 4 weeks.  4) Patient will be independent with HEP in 3 visits in order to allow for improved function with home and community tasks.  LTG  1) Patient will improve Quick Dash score to </=15% to show an improvement in function in 8 weeks.  2) Patient will  improve scapular and shld strength to 4+/5 in order to reduce compensatory guarding in upper trapezius and greater functional use of upper extremities in 8 weeks.   3) Patient will be able to perform ADLs without pain exceeding 2/10 on the VAS in 8 weeks.  4) Patient will return to golf/exercise without deficits to improve QOL in 8 weeks.                Tonya Bell, PT

## 2024-11-19 ENCOUNTER — TREATMENT (OUTPATIENT)
Dept: PHYSICAL THERAPY | Facility: CLINIC | Age: 49
End: 2024-11-19
Payer: COMMERCIAL

## 2024-11-19 DIAGNOSIS — M25.512 ACUTE PAIN OF LEFT SHOULDER: ICD-10-CM

## 2024-11-19 PROCEDURE — 97140 MANUAL THERAPY 1/> REGIONS: CPT | Mod: GP | Performed by: PHYSICAL THERAPIST

## 2024-11-19 PROCEDURE — 97110 THERAPEUTIC EXERCISES: CPT | Mod: GP | Performed by: PHYSICAL THERAPIST

## 2024-11-19 ASSESSMENT — PAIN - FUNCTIONAL ASSESSMENT: PAIN_FUNCTIONAL_ASSESSMENT: 0-10

## 2024-11-19 ASSESSMENT — PAIN SCALES - GENERAL: PAINLEVEL_OUTOF10: 1

## 2024-11-19 NOTE — PROGRESS NOTES
"  Physical Therapy Progress Note    Patient Name: Jeff Gan  MRN: [id number]  Today's Date: 11/19/2024  Time Calculation  Start Time: 0832  Stop Time: 0915  Time Calculation (min): 43 min  PT Therapeutic Procedures Time Entry  Manual Therapy Time Entry: 8  Therapeutic Exercise Time Entry: 35       Current Problem  Problem List Items Addressed This Visit             ICD-10-CM       Musculoskeletal and Injuries    Acute pain of left shoulder M25.512       Insurance:  Number of Treatments Authorized: 10/60 (updated)          Subjective   General  Reason for Referral: L shld pain, L sided neck pain  Referred By: NAIMA Jc  Past Medical History Relevant to Rehab: high cholesterol  General Comment: Pt reports he's doing better overall. Neck has been better over the past week. Still getting some pinching pain in shoulder with abduction/elevation motion. Less severe pain than he's had. Sore today d/t work out yesterday.    Performing HEP?: Yes    Precautions  Precautions  STEADI Fall Risk Score (The score of 4 or more indicates an increased risk of falling): 2  Precautions Comment: none  Pain  Pain Assessment: 0-10  0-10 (Numeric) Pain Score: 1  Pain Type: Acute pain  Pain Location: Shoulder  Pain Orientation: Left    Objective   Cervical AROM in degrees:  Flexion 53  Extension 60  Lateral flexion (R,L) 30 pain, 20  Rotation (R,L) 68, 63     Shld AROM (degrees)    Flexion (R,L) 160, 150    Ext (R,L) 74, 66    Abduction (R,L) 160, 155* increased time to get to this range     ER behind head (R,L) T3, T2    IR behind back (R,L) T3, L1     L Flexion 4+/5 \"uncomfortable\"      Abduction 4+/5 \"uncomfortable\"      ER at side 5/5 \"uncomfortable\"      IR at side 5/5 \"uncomfortable\"    Outcome Measures:  Other Measures  Disability of Arm Shoulder Hand (DASH): 27.27  Neck Disability Index: 8%    Treatments:  Therapeutic Exercise  Therapeutic Exercise Activity 1: SciFit UBE 3'/3' F/B x 6 minutes  Therapeutic Exercise Activity 2: " *AROM / MMT measurements  Therapeutic Exercise Activity 3: L shld dowel AAROM abduction x 1 min  Therapeutic Exercise Activity 4: L shld dowel IR sweeps behind back x 1 min  Therapeutic Exercise Activity 5: L shld dowel IR behind back x 1 min  Therapeutic Exercise Activity 6: L AROM shld IR behind x 1 min  Therapeutic Exercise Activity 7: B shld med ball 4# rotations 30 sec x 2  Therapeutic Exercise Activity 8: B shld med chest presses 4# slight angle x 1 min  Therapeutic Exercise Activity 9: L shld flex/abd taps at wall 3.3# ball x 30 sec  Therapeutic Exercise Activity 10: L shld flex/scaption ball taps 3.3# x 30 sec  Therapeutic Exercise Activity 11: L shld abd 90/90 30 sec x 2 with purple ball  Therapeutic Exercise Activity 12: *basketball shots/passes at different angles to mimic play with son x 3 mins  Therapeutic Exercise Activity 13: Dynamic perturbations supine 90* 30 sec x 2    Manual Therapy  Manual Therapy Activity 1: STM/DSTM L UT, LEV, med scap boarder, infra, pecs, delt, bicep  Manual Therapy Activity 2: SOR, manual cervical distraction    OP EDUCATION:  Outpatient Education  Education Comment: reviewed    Assessment:   Patient is 49 year old evaluated and treated x 10 visits to physical therapy with signs and symptoms consistent with L shoulder pain and subsequent L cervicalgia. Cervicalgia has significant improved. Patient has shown improved shld ROM and overall strength, however continues with limitations with abd/ER strength and IR ROM. Pt would benefit from continued skilled physical therapy in order to address the stated deficits and return to daily tasks with reduced pain and improved function.     Plan:  OP PT Plan  Treatment/Interventions: Cryotherapy, Dry needling, Education/ Instruction, Electrical stimulation, Gait training, Hot pack, Manual therapy, Neuromuscular re-education, Self care/ home management, Taping techniques, Therapeutic activities, Therapeutic exercises, Vasopneumatic  device  PT Plan: Skilled PT (cervical retractions, postural re ed, L shld mobility and scap strengthening ER/abd focus)  PT Frequency: 1 time per week (*update next visit)  Duration: 6-8 more weeks; reducing frequency as goals are met  Onset Date: 08/14/24  Number of Treatments Authorized: 10/60 (updated)  Rehab Potential: Good  Plan of Care Agreement: Patient    Goals:  Active       L shld pain/cervicalgia       STG/LTG (Progressing)       Start:  10/01/24    Expected End:  11/26/24       STG  1) Patient will improve Quick Dash score by 8% to show an improvement in function in 4 weeks. IN PROGRESS  2) Patient will show an improvement in shoulder flex/abd to 160 AROM without pain in order to allow for improved ability to perform household chores in 4 weeks. IN PROGRESS  3) Patient will be able to perform ADLs without pain exceeding 3/10 on the VAS in 4 weeks. MET  4) Patient will be independent with HEP in 3 visits in order to allow for improved function with home and community tasks. MET  LTG  1) Patient will improve Quick Dash score to </=15% to show an improvement in function in 8 weeks. IN PROGRESS   2) Patient will improve scapular and shld strength to 4+/5 in order to reduce compensatory guarding in upper trapezius and greater functional use of upper extremities in 8 weeks. IN PROGRESS  3) Patient will be able to perform ADLs without pain exceeding 2/10 on the VAS in 8 weeks. IN PROGRESS  4) Patient will return to golf/exercise without deficits to improve QOL in 8 weeks. IN PROGRESS                Tonya Bell, PT

## 2024-12-03 ENCOUNTER — APPOINTMENT (OUTPATIENT)
Dept: PHYSICAL THERAPY | Facility: CLINIC | Age: 49
End: 2024-12-03
Payer: COMMERCIAL

## 2024-12-06 ENCOUNTER — TREATMENT (OUTPATIENT)
Dept: PHYSICAL THERAPY | Facility: CLINIC | Age: 49
End: 2024-12-06
Payer: COMMERCIAL

## 2024-12-06 DIAGNOSIS — M25.512 ACUTE PAIN OF LEFT SHOULDER: ICD-10-CM

## 2024-12-06 DIAGNOSIS — M54.2 PAIN, NECK: Primary | ICD-10-CM

## 2024-12-06 PROCEDURE — 97140 MANUAL THERAPY 1/> REGIONS: CPT | Mod: GP | Performed by: PHYSICAL THERAPIST

## 2024-12-06 PROCEDURE — 97110 THERAPEUTIC EXERCISES: CPT | Mod: GP | Performed by: PHYSICAL THERAPIST

## 2024-12-06 ASSESSMENT — PAIN - FUNCTIONAL ASSESSMENT: PAIN_FUNCTIONAL_ASSESSMENT: 0-10

## 2024-12-06 ASSESSMENT — PAIN SCALES - GENERAL: PAINLEVEL_OUTOF10: 1

## 2024-12-06 NOTE — PROGRESS NOTES
Physical Therapy Treatment    Patient Name: Jeff Gan  MRN: [id number]  Today's Date: 12/6/2024  Time Calculation  Start Time: 1036  Stop Time: 1123  Time Calculation (min): 47 min  PT Therapeutic Procedures Time Entry  Manual Therapy Time Entry: 15  Therapeutic Exercise Time Entry: 31       Current Problem  Problem List Items Addressed This Visit             ICD-10-CM       Musculoskeletal and Injuries    Acute pain of left shoulder M25.512    Pain, neck - Primary M54.2       Insurance:  Number of Treatments Authorized: 11/60 (updated)          Subjective   General  Reason for Referral: L shld pain, L sided neck pain  Referred By: NAIMA Jc  Past Medical History Relevant to Rehab: high cholesterol  General Comment: Pt reports he's very sore today - notes if he moves it a certain way he'll still get sharp pains but able to lift arm better and do exercises. Note he has been shoveling a lot and snow blowing with the weather.    Performing HEP?: Yes    Precautions  Precautions  STEADI Fall Risk Score (The score of 4 or more indicates an increased risk of falling): 2  Precautions Comment: none  Pain  Pain Assessment: 0-10  0-10 (Numeric) Pain Score: 1  Pain Type: Acute pain  Pain Location: Shoulder  Pain Orientation: Left    Objective    L shld flexion 155  L shld abduction 160      Treatments:  Therapeutic Exercise  Therapeutic Exercise Activity 1: SciFit UBE 2'/2' F/B x 6 minutes  Therapeutic Exercise Activity 2: thoracic extension over chair with hands clasped x 1 min  Therapeutic Exercise Activity 3: B shld flexion, abduction x 5 reps each  Therapeutic Exercise Activity 4: R/L shld crawl up wall red swiss ball x 1 min  Therapeutic Exercise Activity 5: L shld flexion with ball taps (red) x 1 min  Therapeutic Exercise Activity 6: L shld scaption with ball taps (red) x 1 min  Therapeutic Exercise Activity 7: L shld abd/ER at 90* red ball taps x 1 min  Therapeutic Exercise Activity 8: Chest press 6# x 1  min  Therapeutic Exercise Activity 9: Shld press 6# med ball x 1 min  Therapeutic Exercise Activity 10: B shld ER elbows at side green loop palms up/palms down x 30 sec each    Manual Therapy  Manual Therapy Activity 1: STM/DSTM L>R UT, Lev scap, med scap boarder, infra, pecs, delt, bicep  Manual Therapy Activity 2: SOR, manual cervical distraction    OP EDUCATION:  Outpatient Education  Education Comment: reviewed - posture for neck in driving and activity    Assessment:  PT Assessment  Assessment Comment: Pt continues to show good progress with L shoulder, however increased cervical pain this date, which may be from snow removal activities. Pt challenged with scaption/abd planes but able to complete exercises with mm fatigue vs pain. Increased tension at R>L UT and L>R lev scap, improving with STM, warned on post tx soreness.    Plan:  OP PT Plan  Treatment/Interventions: Cryotherapy, Dry needling, Education/ Instruction, Electrical stimulation, Gait training, Hot pack, Manual therapy, Neuromuscular re-education, Self care/ home management, Taping techniques, Therapeutic activities, Therapeutic exercises, Vasopneumatic device  PT Plan: Skilled PT (cervical retractions, postural re ed, L shld mobility and scap strengthening ER/abd focus)  PT Frequency: 1 time per week (*updated visit 10)  Duration: 6-8 more weeks; reducing frequency as goals are met  Onset Date: 08/14/24  Number of Treatments Authorized: 11/60 (updated)  Rehab Potential: Good  Plan of Care Agreement: Patient    Goals:  Active       L shld pain/cervicalgia       STG/LTG (Progressing)       Start:  10/01/24    Expected End:  11/26/24       STG  1) Patient will improve Quick Dash score by 8% to show an improvement in function in 4 weeks. IN PROGRESS  2) Patient will show an improvement in shoulder flex/abd to 160 AROM without pain in order to allow for improved ability to perform household chores in 4 weeks. IN PROGRESS  3) Patient will be able to  perform ADLs without pain exceeding 3/10 on the VAS in 4 weeks. MET  4) Patient will be independent with HEP in 3 visits in order to allow for improved function with home and community tasks. MET  LTG  1) Patient will improve Quick Dash score to </=15% to show an improvement in function in 8 weeks. IN PROGRESS   2) Patient will improve scapular and shld strength to 4+/5 in order to reduce compensatory guarding in upper trapezius and greater functional use of upper extremities in 8 weeks. IN PROGRESS  3) Patient will be able to perform ADLs without pain exceeding 2/10 on the VAS in 8 weeks. IN PROGRESS  4) Patient will return to golf/exercise without deficits to improve QOL in 8 weeks. IN PROGRESS                Tonya Bell, PT

## 2024-12-10 ENCOUNTER — TREATMENT (OUTPATIENT)
Dept: PHYSICAL THERAPY | Facility: CLINIC | Age: 49
End: 2024-12-10
Payer: COMMERCIAL

## 2024-12-10 DIAGNOSIS — M25.512 ACUTE PAIN OF LEFT SHOULDER: Primary | ICD-10-CM

## 2024-12-10 DIAGNOSIS — M54.2 PAIN, NECK: ICD-10-CM

## 2024-12-10 PROCEDURE — 97110 THERAPEUTIC EXERCISES: CPT | Mod: GP | Performed by: PHYSICAL THERAPIST

## 2024-12-10 PROCEDURE — 97140 MANUAL THERAPY 1/> REGIONS: CPT | Mod: GP | Performed by: PHYSICAL THERAPIST

## 2024-12-10 ASSESSMENT — PAIN - FUNCTIONAL ASSESSMENT: PAIN_FUNCTIONAL_ASSESSMENT: 0-10

## 2024-12-10 ASSESSMENT — PAIN SCALES - GENERAL: PAINLEVEL_OUTOF10: 3

## 2024-12-10 NOTE — PROGRESS NOTES
Physical Therapy Treatment    Patient Name: Jeff Gan  MRN: [id number]  Today's Date: 12/10/2024  Time Calculation  Start Time: 0832  Stop Time: 0915  Time Calculation (min): 43 min  PT Therapeutic Procedures Time Entry  Manual Therapy Time Entry: 25  Therapeutic Exercise Time Entry: 17       Current Problem  Problem List Items Addressed This Visit             ICD-10-CM       Musculoskeletal and Injuries    Acute pain of left shoulder - Primary M25.512    Pain, neck M54.2       Insurance:  Number of Treatments Authorized: 12/60          Subjective   General  Reason for Referral: L shld pain, L sided neck pain  Referred By: NAIMA Jc  Past Medical History Relevant to Rehab: high cholesterol  General Comment: Pt reports neck and shld are still sore but feeling better from last session. Not sore after last session.    Performing HEP?: Yes    Precautions  Precautions  STEADI Fall Risk Score (The score of 4 or more indicates an increased risk of falling): 2  Precautions Comment: none  Pain  Pain Assessment: 0-10  0-10 (Numeric) Pain Score: 3  Pain Type: Acute pain  Pain Location: Neck    Objective    Painful L shoulder abduction and IR on arrival   Improved to IR L3 following manual      Treatments:  Therapeutic Exercise  Therapeutic Exercise Activity 1: SciFit UBE 3'/3' F/B x 6 minutes  Therapeutic Exercise Activity 2: OTD pulleys flexion x 2 minutes  Therapeutic Exercise Activity 3: L shoulder flexion sitting x 8 reps  Therapeutic Exercise Activity 4: OTD pulleys abduction  Therapeutic Exercise Activity 5: L scaption x 1 min  Therapeutic Exercise Activity 6: Hand over hand shld flexion x 1 min  Therapeutic Exercise Activity 7: L shld dangling 5# x 1 min    Manual Therapy  Manual Therapy Activity 1: STM/DSTM L>R UT, Lev scap, med scap boarder, infra, pecs, delt, bicep  Manual Therapy Activity 2: SOR, manual cervical distraction  Manual Therapy Activity 3: L GHJ mobs inf/post grade II/III for pain and mobility,  varying degrees of abduction    OP EDUCATION:  Outpatient Education  Education Comment: reviewed    Assessment:  PT Assessment  Assessment Comment: Pt with increased pain and stiffness in L shoulder today throughout session. Discussed possible need for ortho referral if symptoms continue. Focused more on manual with GHJ mobs with improvement in stiffness and mobility following. Educated on continuing ROM exercises but to caution forceful IR with good understanding.    Plan:  OP PT Plan  Treatment/Interventions: Cryotherapy, Dry needling, Education/ Instruction, Electrical stimulation, Gait training, Hot pack, Manual therapy, Neuromuscular re-education, Self care/ home management, Taping techniques, Therapeutic activities, Therapeutic exercises, Vasopneumatic device  PT Plan: Skilled PT (cervical retractions, postural re ed, L shld mobility and scap strengthening ER/abd focus)  PT Frequency: 1 time per week (*updated visit 10)  Duration: 6-8 more weeks; reducing frequency as goals are met  Onset Date: 08/14/24  Number of Treatments Authorized: 12/60  Rehab Potential: Good  Plan of Care Agreement: Patient    Goals:  Active       L shld pain/cervicalgia       STG/LTG (Progressing)       Start:  10/01/24    Expected End:  11/26/24       STG  1) Patient will improve Quick Dash score by 8% to show an improvement in function in 4 weeks. IN PROGRESS  2) Patient will show an improvement in shoulder flex/abd to 160 AROM without pain in order to allow for improved ability to perform household chores in 4 weeks. IN PROGRESS  3) Patient will be able to perform ADLs without pain exceeding 3/10 on the VAS in 4 weeks. MET  4) Patient will be independent with HEP in 3 visits in order to allow for improved function with home and community tasks. MET  LTG  1) Patient will improve Quick Dash score to </=15% to show an improvement in function in 8 weeks. IN PROGRESS   2) Patient will improve scapular and shld strength to 4+/5 in order  to reduce compensatory guarding in upper trapezius and greater functional use of upper extremities in 8 weeks. IN PROGRESS  3) Patient will be able to perform ADLs without pain exceeding 2/10 on the VAS in 8 weeks. IN PROGRESS  4) Patient will return to golf/exercise without deficits to improve QOL in 8 weeks. IN PROGRESS                Tonya Bell, PT

## 2024-12-17 ENCOUNTER — APPOINTMENT (OUTPATIENT)
Dept: PHYSICAL THERAPY | Facility: CLINIC | Age: 49
End: 2024-12-17
Payer: COMMERCIAL

## 2024-12-31 ENCOUNTER — APPOINTMENT (OUTPATIENT)
Dept: PHYSICAL THERAPY | Facility: CLINIC | Age: 49
End: 2024-12-31
Payer: COMMERCIAL

## 2025-01-07 ENCOUNTER — TREATMENT (OUTPATIENT)
Dept: PHYSICAL THERAPY | Facility: CLINIC | Age: 50
End: 2025-01-07
Payer: COMMERCIAL

## 2025-01-07 DIAGNOSIS — M54.2 PAIN, NECK: ICD-10-CM

## 2025-01-07 DIAGNOSIS — M25.512 ACUTE PAIN OF LEFT SHOULDER: Primary | ICD-10-CM

## 2025-01-07 PROCEDURE — 97140 MANUAL THERAPY 1/> REGIONS: CPT | Mod: GP | Performed by: PHYSICAL THERAPIST

## 2025-01-07 PROCEDURE — 97110 THERAPEUTIC EXERCISES: CPT | Mod: GP | Performed by: PHYSICAL THERAPIST

## 2025-01-07 ASSESSMENT — PAIN - FUNCTIONAL ASSESSMENT: PAIN_FUNCTIONAL_ASSESSMENT: 0-10

## 2025-01-07 ASSESSMENT — PAIN SCALES - GENERAL: PAINLEVEL_OUTOF10: 2

## 2025-01-07 NOTE — PROGRESS NOTES
Physical Therapy Treatment    Patient Name: Jeff Gan  MRN: [id number]  Today's Date: 1/7/2025  Time Calculation  Start Time: 0832  Stop Time: 0915  Time Calculation (min): 43 min  PT Therapeutic Procedures Time Entry  Manual Therapy Time Entry: 15  Therapeutic Exercise Time Entry: 27       Current Problem  Problem List Items Addressed This Visit             ICD-10-CM       Musculoskeletal and Injuries    Acute pain of left shoulder - Primary M25.512    Pain, neck M54.2       Insurance:  Number of Treatments Authorized: 1/60 (12 under current POC)          Subjective   General  Reason for Referral: L shld pain, L sided neck pain  Referred By: NAIMA Jc  Past Medical History Relevant to Rehab: high cholesterol  General Comment: Pt reports he's been feeling about the same - most of his day is good but if he's moving his arm the wrong way, it still hurts. Less bad days. Difficulty having a snowball fight with his son the other day.    Performing HEP?: Yes    Precautions  Precautions  STEADI Fall Risk Score (The score of 4 or more indicates an increased risk of falling): 2  Precautions Comment: none  Pain  Pain Assessment: 0-10  0-10 (Numeric) Pain Score: 2  Pain Type: Acute pain  Pain Location: Shoulder  Pain Orientation: Left    Objective    L shld flexion 130* with pain   L shld flexion 120* with pain - can push further      Treatments:  Therapeutic Exercise  Therapeutic Exercise Activity 1: SciFit UBE 3'/3' F/B x 6 minutes  Therapeutic Exercise Activity 2: R/L shld crawl up wall red swiss ball x 1 min  Therapeutic Exercise Activity 3: L shld scaption wall slides x 1 min  Therapeutic Exercise Activity 4: Wall push ups x 1 min  Therapeutic Exercise Activity 5: L shld abduction x 1 min  Therapeutic Exercise Activity 6: OTD pulleys shld abduction x 3 minutes  Therapeutic Exercise Activity 7: OTD pulleys shd flexion x 3 min  Therapeutic Exercise Activity 8: L shoulder abduction in S/L x 5 reps -  difficulty  Therapeutic Exercise Activity 9: L shoulder flexion supine x 1 min  Therapeutic Exercise Activity 10: *exercise review    Manual Therapy  Manual Therapy Activity 1: STM/DSTM L>R UT, Lev scap, med scap boarder, infra, pecs, delt, bicep  Manual Therapy Activity 2: SOR, manual cervical distraction  Manual Therapy Activity 3: L GHJ mobs inf/post grade II/III for pain and mobility, varying degrees of abduction    OP EDUCATION:  Outpatient Education  Education Comment: Add shld flexion supine - MD f/u    Assessment:  PT Assessment  Assessment Comment: Pt continues with similar pain patterns. Overall improvement with therapy but continues to have difficulty with overhead motions with stiffness and pain with progressive exercises. Educated on f/u with MD for possible injection or further imaging but will continue with PT. Pt also coaxed to continue overhead shld motion supine/sidelying for stretching.    Plan:  OP PT Plan  Treatment/Interventions: Cryotherapy, Dry needling, Education/ Instruction, Electrical stimulation, Gait training, Hot pack, Manual therapy, Neuromuscular re-education, Self care/ home management, Taping techniques, Therapeutic activities, Therapeutic exercises, Vasopneumatic device  PT Plan: Skilled PT (cervical retractions, postural re ed, L shld mobility and scap strengthening ER/abd focus)  PT Frequency: 1 time per week (*updated visit 10)  Duration: 6-8 more weeks; reducing frequency as goals are met  Onset Date: 08/14/24  Number of Treatments Authorized: 1/60 (12 under current POC)  Rehab Potential: Good  Plan of Care Agreement: Patient    Goals:  Active       L shld pain/cervicalgia       STG/LTG (Progressing)       Start:  10/01/24    Expected End:  11/26/24       STG  1) Patient will improve Quick Dash score by 8% to show an improvement in function in 4 weeks. IN PROGRESS  2) Patient will show an improvement in shoulder flex/abd to 160 AROM without pain in order to allow for improved  ability to perform household chores in 4 weeks. IN PROGRESS  3) Patient will be able to perform ADLs without pain exceeding 3/10 on the VAS in 4 weeks. MET  4) Patient will be independent with HEP in 3 visits in order to allow for improved function with home and community tasks. MET  LTG  1) Patient will improve Quick Dash score to </=15% to show an improvement in function in 8 weeks. IN PROGRESS   2) Patient will improve scapular and shld strength to 4+/5 in order to reduce compensatory guarding in upper trapezius and greater functional use of upper extremities in 8 weeks. IN PROGRESS  3) Patient will be able to perform ADLs without pain exceeding 2/10 on the VAS in 8 weeks. IN PROGRESS  4) Patient will return to golf/exercise without deficits to improve QOL in 8 weeks. IN PROGRESS                Tonya Bell, PT

## 2025-01-13 NOTE — PROGRESS NOTES
Physical Therapy Treatment    Patient Name: Jeff Gan  MRN: [id number]  Today's Date: 1/14/2025  Time Calculation  Start Time: 0825  Stop Time: 0912  Time Calculation (min): 47 min  PT Therapeutic Procedures Time Entry  Manual Therapy Time Entry: 10  Therapeutic Exercise Time Entry: 36       Current Problem  Problem List Items Addressed This Visit             ICD-10-CM       Musculoskeletal and Injuries    Acute pain of left shoulder - Primary M25.512    Relevant Orders    Follow Up In Physical Therapy    Pain, neck M54.2       Insurance:  Number of Treatments Authorized: 2/60 (12 under current POC)          Subjective   General  Reason for Referral: L shld pain, L sided neck pain  Referred By: NAIMA Jc  Past Medical History Relevant to Rehab: high cholesterol  General Comment: Pt doing better with motion and likes adding back in supine flexion for flexibility. Has been doing a little bit less weights, which might be helping pain.    Performing HEP?: Yes    Precautions  Precautions  STEADI Fall Risk Score (The score of 4 or more indicates an increased risk of falling): 2  Precautions Comment: none  Pain  Pain Assessment: 0-10  0-10 (Numeric) Pain Score: 2  Pain Type: Acute pain  Pain Location: Shoulder  Pain Orientation: Left    Objective    L shld abd/flex limited at end range using pulleys      Treatments:  Therapeutic Exercise  Therapeutic Exercise Activity 1: OTD pulleys shd flexion x 4 min  Therapeutic Exercise Activity 2: OTD pulleys shld abduction x 2 minutes  Therapeutic Exercise Activity 3: *exercise review  Therapeutic Exercise Activity 4: B shld flexion towel x 1 min  Therapeutic Exercise Activity 5: L shld abduction wall slides x 1 min  Therapeutic Exercise Activity 6: L shld abduction CW/CCW wall slides x 30 sec each  Therapeutic Exercise Activity 7: L shld abduction facing wall - wall slides x 1 min  Therapeutic Exercise Activity 8: Wall push ups x 1 min  Therapeutic Exercise Activity 9: Half  wall push ups x 10 reps  Therapeutic Exercise Activity 10: Hand plank hold x 30 sec, mod hand plank hold x 30 sec  Therapeutic Exercise Activity 11: Scap protraction/retraction in mod plank x 30 sec  Therapeutic Exercise Activity 12: L shoulder ER dowel with 60* abduction x 1 min  Therapeutic Exercise Activity 13: B shld ER with dowel x 1 min  Therapeutic Exercise Activity 14: *HEP update    Manual Therapy  Manual Therapy Activity 1: STM/DSTM L>R UT, Lev scap, med scap boarder, infra, pecs, delt, bicep  Manual Therapy Activity 2: SOR, manual cervical distraction  Manual Therapy Activity 3: L GHJ mobs inf/post grade II/III for pain and mobility, varying degrees of abduction    OP EDUCATION:  Outpatient Education  Education Comment: Access Code: [email or web address]  URL: [email or web address]  Date: 01/14/2025  Prepared by: Tonya Bell    Exercises  - Standing Shoulder Abduction Slides at Wall  - 1 x daily - 3-5 x weekly - 2 sets - 10 reps  - Full Plank  - 1 x daily - 3-5 x weekly - 1 sets - 2 reps - 30 sec hold  - Push Up on Table  - 1 x daily - 3-5 x weekly - 2 sets - 10 reps  - Quadruped Scapular Protraction and Retraction  - 1 x daily - 3-5 x weekly - 2 sets - 10 reps  - Standing Shoulder External Internal Rotation AAROM with Dowel  - 1 x daily - 3-5 x weekly - 1 sets - 10 reps    Assessment:  PT Assessment  Assessment Comment: Pt continues with slower progress under POC but some improvements in mobility and tolerance to exercises. Added more loaded exercises this date without an increase in pain - updated for HEP with good understanding. Challenged with abduction and shld ER.    Plan:  OP PT Plan  Treatment/Interventions: Cryotherapy, Dry needling, Education/ Instruction, Electrical stimulation, Gait training, Hot pack, Manual therapy, Neuromuscular re-education, Self care/ home management, Taping techniques, Therapeutic activities, Therapeutic exercises, Vasopneumatic device  PT Plan: Skilled PT  (cervical retractions, postural re ed, L shld mobility and scap strengthening ER/abd focus)  PT Frequency: 1 time per week (*updated visit 10)  Duration: 6-8 more weeks; reducing frequency as goals are met  Onset Date: 08/14/24  Number of Treatments Authorized: 2/60 (12 under current POC)  Rehab Potential: Good  Plan of Care Agreement: Patient    Goals:  Active       L shld pain/cervicalgia       STG/LTG (Progressing)       Start:  10/01/24    Expected End:  11/26/24       STG  1) Patient will improve Quick Dash score by 8% to show an improvement in function in 4 weeks. IN PROGRESS  2) Patient will show an improvement in shoulder flex/abd to 160 AROM without pain in order to allow for improved ability to perform household chores in 4 weeks. IN PROGRESS  3) Patient will be able to perform ADLs without pain exceeding 3/10 on the VAS in 4 weeks. MET  4) Patient will be independent with HEP in 3 visits in order to allow for improved function with home and community tasks. MET  LTG  1) Patient will improve Quick Dash score to </=15% to show an improvement in function in 8 weeks. IN PROGRESS   2) Patient will improve scapular and shld strength to 4+/5 in order to reduce compensatory guarding in upper trapezius and greater functional use of upper extremities in 8 weeks. IN PROGRESS  3) Patient will be able to perform ADLs without pain exceeding 2/10 on the VAS in 8 weeks. IN PROGRESS  4) Patient will return to golf/exercise without deficits to improve QOL in 8 weeks. IN PROGRESS                Tonya Bell, PT

## 2025-01-14 ENCOUNTER — TREATMENT (OUTPATIENT)
Dept: PHYSICAL THERAPY | Facility: CLINIC | Age: 50
End: 2025-01-14
Payer: COMMERCIAL

## 2025-01-14 DIAGNOSIS — M54.2 PAIN, NECK: ICD-10-CM

## 2025-01-14 DIAGNOSIS — M25.512 ACUTE PAIN OF LEFT SHOULDER: Primary | ICD-10-CM

## 2025-01-14 PROCEDURE — 97110 THERAPEUTIC EXERCISES: CPT | Mod: GP | Performed by: PHYSICAL THERAPIST

## 2025-01-14 PROCEDURE — 97140 MANUAL THERAPY 1/> REGIONS: CPT | Mod: GP | Performed by: PHYSICAL THERAPIST

## 2025-01-14 ASSESSMENT — PAIN - FUNCTIONAL ASSESSMENT: PAIN_FUNCTIONAL_ASSESSMENT: 0-10

## 2025-01-14 ASSESSMENT — PAIN SCALES - GENERAL: PAINLEVEL_OUTOF10: 2

## 2025-01-21 ENCOUNTER — TREATMENT (OUTPATIENT)
Dept: PHYSICAL THERAPY | Facility: CLINIC | Age: 50
End: 2025-01-21
Payer: COMMERCIAL

## 2025-01-21 DIAGNOSIS — M54.2 PAIN, NECK: ICD-10-CM

## 2025-01-21 DIAGNOSIS — M25.512 ACUTE PAIN OF LEFT SHOULDER: Primary | ICD-10-CM

## 2025-01-21 PROCEDURE — 97110 THERAPEUTIC EXERCISES: CPT | Mod: GP | Performed by: PHYSICAL THERAPIST

## 2025-01-21 PROCEDURE — 97140 MANUAL THERAPY 1/> REGIONS: CPT | Mod: GP | Performed by: PHYSICAL THERAPIST

## 2025-01-21 ASSESSMENT — PAIN - FUNCTIONAL ASSESSMENT: PAIN_FUNCTIONAL_ASSESSMENT: 0-10

## 2025-01-21 ASSESSMENT — PAIN SCALES - GENERAL: PAINLEVEL_OUTOF10: 2

## 2025-01-21 NOTE — PROGRESS NOTES
Physical Therapy Treatment    Patient Name: Jeff Gan  MRN: [id number]  Today's Date: 1/21/2025  Time Calculation  Start Time: 0832  Stop Time: 0917  Time Calculation (min): 45 min  PT Therapeutic Procedures Time Entry  Manual Therapy Time Entry: 9  Therapeutic Exercise Time Entry: 35       Current Problem  Problem List Items Addressed This Visit             ICD-10-CM       Musculoskeletal and Injuries    Acute pain of left shoulder - Primary M25.512    Pain, neck M54.2       Insurance:  Number of Treatments Authorized: 3/60 (12 under current POC)          Subjective   General  Reason for Referral: L shld pain, L sided neck pain  Referred By: NAIMA Jc  Past Medical History Relevant to Rehab: high cholesterol  General Comment: Pt reports he's still slowly getting better. Notes he thinks he's moving his arm at night, causing pain in the AM. Feels about 60-65% of his normal. Less twinges overall. Some increased R neck pain.    Performing HEP?: Yes    Precautions  Precautions  STEADI Fall Risk Score (The score of 4 or more indicates an increased risk of falling): 2  Precautions Comment: none  Pain  Pain Assessment: 0-10  0-10 (Numeric) Pain Score: 2  Pain Type: Acute pain  Pain Location: Shoulder  Pain Orientation: Left    Objective    L shld flexion with pulleys 150*       Treatments:  Therapeutic Exercise  Therapeutic Exercise Activity 1: SciFit UBE 3'/3' F/B x 6 minutes  Therapeutic Exercise Activity 2: OTD pulleys shd flexion x 3 min  Therapeutic Exercise Activity 3: B scap retraction with ER palms up x 1 min  Therapeutic Exercise Activity 4: L scaption wall slides x 1 min  Therapeutic Exercise Activity 5: Hand plank hold x 60 sec  Therapeutic Exercise Activity 6: Scap protraction/retraction in mod plank 30 sec, x2  Therapeutic Exercise Activity 7: Half wall push ups x 10 reps  Therapeutic Exercise Activity 8: L shoulder ER in abducted shld position x 1 min  Therapeutic Exercise Activity 9: L shld flexion  taps 1# green ball x 1 min  Therapeutic Exercise Activity 10: L shld 1# green ball taps at 45* abducted shld x 1 min  Therapeutic Exercise Activity 11: L shld 1# green ball taps at 60* abducted shld x 1 min  Therapeutic Exercise Activity 12: Serratus slide blue foam roller x 1 min  Therapeutic Exercise Activity 13: B shld ER yellow loop, elbows at side palms up/palms down x 30 sec each    Manual Therapy  Manual Therapy Activity 1: STM/DSTM L>R UT, Lev scap, med scap boarder, infra, pecs, delt, bicep  Manual Therapy Activity 2: L GHJ mobs inf/post grade II/III for pain and mobility, varying degrees of abduction    OP EDUCATION:  Outpatient Education  Education Comment: Reviewed    Assessment:  PT Assessment  Assessment Comment: Pt showing increased endurance with mm work this date, however difficulty with abduction position continues. Fatiguing with banded ER end of session. Relief with L UT and biceps release. Educated on possible MD f/u for injection or additional tx.    Plan:  OP PT Plan  Treatment/Interventions: Cryotherapy, Dry needling, Education/ Instruction, Electrical stimulation, Gait training, Hot pack, Manual therapy, Neuromuscular re-education, Self care/ home management, Taping techniques, Therapeutic activities, Therapeutic exercises, Vasopneumatic device  PT Plan: Skilled PT (cervical retractions, postural re ed, L shld mobility and scap strengthening ER/abd focus)  PT Frequency: 1 time per week (*updated visit 10)  Duration: 6-8 more weeks; reducing frequency as goals are met  Onset Date: 08/14/24  Number of Treatments Authorized: 3/60 (12 under current POC)  Rehab Potential: Good  Plan of Care Agreement: Patient    Goals:  Active       L shld pain/cervicalgia       STG/LTG (Progressing)       Start:  10/01/24    Expected End:  11/26/24       STG  1) Patient will improve Quick Dash score by 8% to show an improvement in function in 4 weeks. IN PROGRESS  2) Patient will show an improvement in shoulder  flex/abd to 160 AROM without pain in order to allow for improved ability to perform household chores in 4 weeks. IN PROGRESS  3) Patient will be able to perform ADLs without pain exceeding 3/10 on the VAS in 4 weeks. MET  4) Patient will be independent with HEP in 3 visits in order to allow for improved function with home and community tasks. MET  LTG  1) Patient will improve Quick Dash score to </=15% to show an improvement in function in 8 weeks. IN PROGRESS   2) Patient will improve scapular and shld strength to 4+/5 in order to reduce compensatory guarding in upper trapezius and greater functional use of upper extremities in 8 weeks. IN PROGRESS  3) Patient will be able to perform ADLs without pain exceeding 2/10 on the VAS in 8 weeks. IN PROGRESS  4) Patient will return to golf/exercise without deficits to improve QOL in 8 weeks. IN PROGRESS                Tonya Bell, PT

## 2025-01-28 ENCOUNTER — TREATMENT (OUTPATIENT)
Dept: PHYSICAL THERAPY | Facility: CLINIC | Age: 50
End: 2025-01-28
Payer: COMMERCIAL

## 2025-01-28 DIAGNOSIS — M25.512 ACUTE PAIN OF LEFT SHOULDER: ICD-10-CM

## 2025-01-28 DIAGNOSIS — M54.2 PAIN, NECK: Primary | ICD-10-CM

## 2025-01-28 PROCEDURE — 97110 THERAPEUTIC EXERCISES: CPT | Mod: GP | Performed by: PHYSICAL THERAPIST

## 2025-01-28 PROCEDURE — 97112 NEUROMUSCULAR REEDUCATION: CPT | Mod: GP | Performed by: PHYSICAL THERAPIST

## 2025-01-28 ASSESSMENT — PAIN - FUNCTIONAL ASSESSMENT: PAIN_FUNCTIONAL_ASSESSMENT: 0-10

## 2025-01-28 ASSESSMENT — PAIN SCALES - GENERAL: PAINLEVEL_OUTOF10: 2

## 2025-01-28 NOTE — PROGRESS NOTES
Physical Therapy Treatment    Patient Name: Jeff Gan  MRN: [id number]  Today's Date: 1/28/2025  Time Calculation  Start Time: 0832  Stop Time: 0917  Time Calculation (min): 45 min  PT Therapeutic Procedures Time Entry  Neuromuscular Re-Education Time Entry: 13  Therapeutic Exercise Time Entry: 31       Current Problem  Problem List Items Addressed This Visit             ICD-10-CM       Musculoskeletal and Injuries    Acute pain of left shoulder M25.512    Pain, neck - Primary M54.2       Insurance:  Number of Treatments Authorized: 4/60 (12 under current POC)          Subjective   General  Reason for Referral: L shld pain, L sided neck pain  Referred By: NAIMA Jc  Past Medical History Relevant to Rehab: high cholesterol  General Comment: Pt reports he's doing a little bit better. Less sharp pains with closing car doors and movements. Still difficulty with putting on shirt but overall doing better.    Performing HEP?: Yes    Precautions  Precautions  STEADI Fall Risk Score (The score of 4 or more indicates an increased risk of falling): 2  Precautions Comment: none  Pain  Pain Assessment: 0-10  0-10 (Numeric) Pain Score: 2  Pain Type: Acute pain  Pain Location: Shoulder  Pain Orientation: Left    Objective    L shld abduction AROM 155*    Treatments:  Therapeutic Exercise  Therapeutic Exercise Activity 1: SciFit UBE 3'/3' F/B x 6 minutes  Therapeutic Exercise Activity 2: OTD pulleys shd abduction x 3 min  Therapeutic Exercise Activity 3: OTD standing IR behind back x 2 min  Therapeutic Exercise Activity 4: L shld scaption red ball taps at wall 2# x 1 min  Therapeutic Exercise Activity 5: L horizontal adduction across body x 10 reps  Therapeutic Exercise Activity 6: L horizontal adduction purple perform better band x 1 min  Therapeutic Exercise Activity 7: L shld row with ER ~45* abd purple perform better band x 1 min  Therapeutic Exercise Activity 8: B posterior fly purple perform better band x 1  min  Therapeutic Exercise Activity 9: B shld ER with face pulls purple perform better x 1 min    Balance/Neuromuscular Re-Education  Balance/Neuromuscular Re-Education Activity Performed: Yes  Balance/Neuromuscular Re-Education Activity 1: Reactive catches with purple ball using L arm x 1 min, repeat x 1  Balance/Neuromuscular Re-Education Activity 2: Bball shot mimic x 10 reps - no pain  Balance/Neuromuscular Re-Education Activity 3: Bball pass reactions x 2 min  Balance/Neuromuscular Re-Education Activity 4: Body blade, left side, yellow, at side x 30 sec, 0-90* shld flexion x 30 sec, modified overhead x 30 sec, 45* abduction x 30 sec    OP EDUCATION:  Outpatient Education  Education Comment: Reviewed - AROM abduction    Assessment:  PT Assessment  Assessment Comment: Progressing well under POC with improved tolerance to exercise. Increased ROM into IR and abduction using pulleys. Challenged with reactions with exercise program but without significant pinching or pain levels. Fatiguing with exercises away from side.    Plan:  OP PT Plan  Treatment/Interventions: Cryotherapy, Dry needling, Education/ Instruction, Electrical stimulation, Gait training, Hot pack, Manual therapy, Neuromuscular re-education, Self care/ home management, Taping techniques, Therapeutic activities, Therapeutic exercises, Vasopneumatic device  PT Plan: Skilled PT (cervical retractions, postural re ed, L shld mobility and scap strengthening ER/abd focus)  PT Frequency: 1 time per week (*updated visit 10)  Duration: 6-8 more weeks; reducing frequency as goals are met  Onset Date: 08/14/24  Number of Treatments Authorized: 4/60 (12 under current POC)  Rehab Potential: Good  Plan of Care Agreement: Patient    Goals:  Active       L shld pain/cervicalgia       STG/LTG (Progressing)       Start:  10/01/24    Expected End:  11/26/24       STG  1) Patient will improve Quick Dash score by 8% to show an improvement in function in 4 weeks. IN  PROGRESS  2) Patient will show an improvement in shoulder flex/abd to 160 AROM without pain in order to allow for improved ability to perform household chores in 4 weeks. IN PROGRESS  3) Patient will be able to perform ADLs without pain exceeding 3/10 on the VAS in 4 weeks. MET  4) Patient will be independent with HEP in 3 visits in order to allow for improved function with home and community tasks. MET  LTG  1) Patient will improve Quick Dash score to </=15% to show an improvement in function in 8 weeks. IN PROGRESS   2) Patient will improve scapular and shld strength to 4+/5 in order to reduce compensatory guarding in upper trapezius and greater functional use of upper extremities in 8 weeks. IN PROGRESS  3) Patient will be able to perform ADLs without pain exceeding 2/10 on the VAS in 8 weeks. IN PROGRESS  4) Patient will return to golf/exercise without deficits to improve QOL in 8 weeks. IN PROGRESS                Tonya Bell, PT

## 2025-02-20 NOTE — PROGRESS NOTES
Physical Therapy Treatment    Patient Name: Jeff Gan  MRN: [id number]  Today's Date: 2/21/2025  Time Calculation  Start Time: 0820  Stop Time: 0901  Time Calculation (min): 41 min  PT Therapeutic Procedures Time Entry  Neuromuscular Re-Education Time Entry: 10  Therapeutic Exercise Time Entry: 30       Current Problem  Problem List Items Addressed This Visit             ICD-10-CM       Musculoskeletal and Injuries    Acute pain of left shoulder - Primary M25.512    Pain, neck M54.2       Insurance:  Number of Treatments Authorized: 5/60 (12 under current POC)          Subjective   General  Reason for Referral: L shld pain, L sided neck pain  Referred By: NAIMA Jc  Past Medical History Relevant to Rehab: high cholesterol  General Comment: Pt reports he's doing about the same still. Gets twinges every other day he'd say. Intermittent neck pain.    Performing HEP?: Yes    Precautions  Precautions  STEADI Fall Risk Score (The score of 4 or more indicates an increased risk of falling): 2  Precautions Comment: none  Pain  Pain Assessment: 0-10  0-10 (Numeric) Pain Score: 2  Pain Type: Acute pain  Pain Location: Shoulder  Pain Orientation: Left    Objective    L shld flexion ~140*, L shld abduction ~145*      Treatments:  Therapeutic Exercise  Therapeutic Exercise Activity 1: SciFit UBE 3'/3' F/B x 6 minutes  Therapeutic Exercise Activity 2: OTD pulleys shd flexion x 3 min  Therapeutic Exercise Activity 3: OTD pulleys shd abduction x 2 min  Therapeutic Exercise Activity 4: OTD pulleys shld IR x 3 min  Therapeutic Exercise Activity 5: AROM L shoulder flexion/abduction x 5 reps each  Therapeutic Exercise Activity 6: Standing circles yellow ball CW/CCW x 1 min each  Therapeutic Exercise Activity 7: Standing yellow ball taps at wall 90/90, 30 sec x 2  Therapeutic Exercise Activity 8: L shoulder flexion green ball taps 1# x 1 min  Therapeutic Exercise Activity 9: L shld scaption 1# x 1 min  Therapeutic Exercise  Activity 10: L shoulder abduction 1# green ball 30 sec x 2  Therapeutic Exercise Activity 11: Standing cervical retraction x 1 min    Balance/Neuromuscular Re-Education  Balance/Neuromuscular Re-Education Activity 1: Body blade, left side, yellow, at side x 30 sec, shld flexion to overhead x 30 sec, 45* abduction x 30 sec, shld flexion at 90* x 30 sec  Balance/Neuromuscular Re-Education Activity 2: Tennis ball catches L hand catch + throw with varying degrees outside JACQUES x 3 min    OP EDUCATION:  Outpatient Education  Education Comment: Abduction + 1# weight, 90/90 taps at wall    Assessment:  PT Assessment  Assessment Comment: Pt continues with progress, however slow in nature. Improved end ROM this date with pulleys. Fatiguing very quickly with shld abduction/scaption exercises. Reactions improving with less fear and pain throughout.    Plan:  OP PT Plan  Treatment/Interventions: Cryotherapy, Dry needling, Education/ Instruction, Electrical stimulation, Gait training, Hot pack, Manual therapy, Neuromuscular re-education, Self care/ home management, Taping techniques, Therapeutic activities, Therapeutic exercises, Vasopneumatic device  PT Plan: Skilled PT (cervical retractions, postural re ed, L shld mobility and scap strengthening ER/abd focus)  PT Frequency: 1 time per week (*updated visit 10)  Duration: 6-8 more weeks; reducing frequency as goals are met  Onset Date: 08/14/24  Number of Treatments Authorized: 5/60 (12 under current POC)  Rehab Potential: Good  Plan of Care Agreement: Patient    Goals:  Active       L shld pain/cervicalgia       STG/LTG (Progressing)       Start:  10/01/24    Expected End:  11/26/24       STG  1) Patient will improve Quick Dash score by 8% to show an improvement in function in 4 weeks. IN PROGRESS  2) Patient will show an improvement in shoulder flex/abd to 160 AROM without pain in order to allow for improved ability to perform household chores in 4 weeks. IN PROGRESS  3) Patient  will be able to perform ADLs without pain exceeding 3/10 on the VAS in 4 weeks. MET  4) Patient will be independent with HEP in 3 visits in order to allow for improved function with home and community tasks. MET  LTG  1) Patient will improve Quick Dash score to </=15% to show an improvement in function in 8 weeks. IN PROGRESS   2) Patient will improve scapular and shld strength to 4+/5 in order to reduce compensatory guarding in upper trapezius and greater functional use of upper extremities in 8 weeks. IN PROGRESS  3) Patient will be able to perform ADLs without pain exceeding 2/10 on the VAS in 8 weeks. IN PROGRESS  4) Patient will return to golf/exercise without deficits to improve QOL in 8 weeks. IN PROGRESS                Tonya Bell, PT

## 2025-02-21 ENCOUNTER — TREATMENT (OUTPATIENT)
Dept: PHYSICAL THERAPY | Facility: CLINIC | Age: 50
End: 2025-02-21
Payer: COMMERCIAL

## 2025-02-21 DIAGNOSIS — M25.512 ACUTE PAIN OF LEFT SHOULDER: Primary | ICD-10-CM

## 2025-02-21 DIAGNOSIS — M54.2 PAIN, NECK: ICD-10-CM

## 2025-02-21 PROCEDURE — 97110 THERAPEUTIC EXERCISES: CPT | Mod: GP | Performed by: PHYSICAL THERAPIST

## 2025-02-21 PROCEDURE — 97112 NEUROMUSCULAR REEDUCATION: CPT | Mod: GP | Performed by: PHYSICAL THERAPIST

## 2025-02-21 ASSESSMENT — PAIN - FUNCTIONAL ASSESSMENT: PAIN_FUNCTIONAL_ASSESSMENT: 0-10

## 2025-02-21 ASSESSMENT — PAIN SCALES - GENERAL: PAINLEVEL_OUTOF10: 2

## 2025-03-07 ENCOUNTER — TREATMENT (OUTPATIENT)
Dept: PHYSICAL THERAPY | Facility: CLINIC | Age: 50
End: 2025-03-07
Payer: COMMERCIAL

## 2025-03-07 DIAGNOSIS — M25.512 ACUTE PAIN OF LEFT SHOULDER: ICD-10-CM

## 2025-03-07 PROCEDURE — 97110 THERAPEUTIC EXERCISES: CPT | Mod: GP | Performed by: PHYSICAL THERAPIST

## 2025-03-07 PROCEDURE — 97112 NEUROMUSCULAR REEDUCATION: CPT | Mod: GP | Performed by: PHYSICAL THERAPIST

## 2025-03-07 ASSESSMENT — PAIN SCALES - GENERAL: PAINLEVEL_OUTOF10: 1

## 2025-03-07 ASSESSMENT — PAIN - FUNCTIONAL ASSESSMENT: PAIN_FUNCTIONAL_ASSESSMENT: 0-10

## 2025-03-07 NOTE — PROGRESS NOTES
"  Physical Therapy Treatment    Patient Name: Jeff Gan  MRN: [id number]  Today's Date: 3/7/2025  Time Calculation  Start Time: 0813  Stop Time: 0859  Time Calculation (min): 46 min  PT Therapeutic Procedures Time Entry  Neuromuscular Re-Education Time Entry: 11  Therapeutic Exercise Time Entry: 34       Current Problem  Problem List Items Addressed This Visit             ICD-10-CM       Musculoskeletal and Injuries    Acute pain of left shoulder M25.512       Insurance:  Number of Treatments Authorized: 6/60 (12 under current POC)          Subjective   General  Reason for Referral: L shld pain, L sided neck pain  Referred By: NAIMA Jc  Past Medical History Relevant to Rehab: high cholesterol  General Comment: Pt reports he threw with his son on tuesday and did pretty well, wasn't too fearful. Getting less pinching.    Performing HEP?: Yes    Precautions  Precautions  STEADI Fall Risk Score (The score of 4 or more indicates an increased risk of falling): 2  Precautions Comment: none  Pain  Pain Assessment: 0-10  0-10 (Numeric) Pain Score: 1  Pain Type: Acute pain  Pain Location: Shoulder  Pain Orientation: Left    Objective    L shld abd/flexion limited at end range with pain   L shld IR to L3      Treatments:  Therapeutic Exercise  Therapeutic Exercise Activity 1: SciFit UBE 3'/3' F/B x 6 minutes  Therapeutic Exercise Activity 2: Doorway stretch low 30\" x 2  Therapeutic Exercise Activity 3: Doorway stretch R,L high single arm x 30 sec each  Therapeutic Exercise Activity 4: OTD pulleys shd flexion x 2 min  Therapeutic Exercise Activity 5: Bkwd shoulder rolls x 10 reps  Therapeutic Exercise Activity 6: OTD pulleys shd abduction x 2 min  Therapeutic Exercise Activity 7: R/L shld crawl up red swiss ball x 1 min  Therapeutic Exercise Activity 8: R,L thoracic rotation at wall 90* flexion x 1 min each  Therapeutic Exercise Activity 9: L/R 2# red ball circles around waist CW/CCW x 1 min each  Therapeutic Exercise " "Activity 10: L shld abd circles at 45* circles CW/CCW x 30 sec each  Therapeutic Exercise Activity 11: B shld pulses 10* shld flexion yellow loop palms in x 30 sec, 30* flexion x 30 sec; 90* shld flex x 30 sec  Therapeutic Exercise Activity 12: B shld pulses into flexion to 90* x 30 sec  Therapeutic Exercise Activity 13: Serratus slide yellow loop at hands x 30\"  Therapeutic Exercise Activity 14: B shld diagonals at 60* x 1 min    Balance/Neuromuscular Re-Education  Balance/Neuromuscular Re-Education Activity 1: L shld scaption palm down 1# green ball catches for reactive motions x 15 reps  Balance/Neuromuscular Re-Education Activity 2: Hand plank hold on bosu x 30 sec  Balance/Neuromuscular Re-Education Activity 3: Hand plank hold with R/L shld taps x 30 sec - mod plank  Balance/Neuromuscular Re-Education Activity 4: Golf swing demos x 5 reps; with dowel - increased speed x 8 reps    OP EDUCATION:  Outpatient Education  Education Comment: Doorway, stretching into restrictions    Assessment:  PT Assessment  Assessment Comment: Pt showing good progress thus date with greater tolerance to exercise. Challenged with dynamic stability exercises but without increased pain. Limited with end range shld IR and horizontal abduction with fast pace as needed for golf swing. Pt very compliant with HEP.    Plan:  OP PT Plan  Treatment/Interventions: Cryotherapy, Dry needling, Education/ Instruction, Electrical stimulation, Gait training, Hot pack, Manual therapy, Neuromuscular re-education, Self care/ home management, Taping techniques, Therapeutic activities, Therapeutic exercises, Vasopneumatic device  PT Plan: Skilled PT (cervical retractions, postural re ed, L shld mobility and scap strengthening ER/abd focus)  PT Frequency: 1 time per week (*updated visit 10)  Duration: 6-8 more weeks; reducing frequency as goals are met  Onset Date: 08/14/24  Number of Treatments Authorized: 6/60 (12 under current POC)  Rehab Potential: " Good  Plan of Care Agreement: Patient    Goals:  Active       L shld pain/cervicalgia       STG/LTG (Progressing)       Start:  10/01/24    Expected End:  11/26/24       STG  1) Patient will improve Quick Dash score by 8% to show an improvement in function in 4 weeks. IN PROGRESS  2) Patient will show an improvement in shoulder flex/abd to 160 AROM without pain in order to allow for improved ability to perform household chores in 4 weeks. IN PROGRESS  3) Patient will be able to perform ADLs without pain exceeding 3/10 on the VAS in 4 weeks. MET  4) Patient will be independent with HEP in 3 visits in order to allow for improved function with home and community tasks. MET  LTG  1) Patient will improve Quick Dash score to </=15% to show an improvement in function in 8 weeks. IN PROGRESS   2) Patient will improve scapular and shld strength to 4+/5 in order to reduce compensatory guarding in upper trapezius and greater functional use of upper extremities in 8 weeks. IN PROGRESS  3) Patient will be able to perform ADLs without pain exceeding 2/10 on the VAS in 8 weeks. IN PROGRESS  4) Patient will return to golf/exercise without deficits to improve QOL in 8 weeks. IN PROGRESS                Tonya Bell, PT

## 2025-03-18 ENCOUNTER — TREATMENT (OUTPATIENT)
Dept: PHYSICAL THERAPY | Facility: CLINIC | Age: 50
End: 2025-03-18
Payer: COMMERCIAL

## 2025-03-18 DIAGNOSIS — M25.512 ACUTE PAIN OF LEFT SHOULDER: ICD-10-CM

## 2025-03-18 DIAGNOSIS — M54.2 PAIN, NECK: Primary | ICD-10-CM

## 2025-03-18 PROCEDURE — 97110 THERAPEUTIC EXERCISES: CPT | Mod: GP | Performed by: PHYSICAL THERAPIST

## 2025-03-18 PROCEDURE — 97112 NEUROMUSCULAR REEDUCATION: CPT | Mod: GP | Performed by: PHYSICAL THERAPIST

## 2025-03-18 ASSESSMENT — PAIN SCALES - GENERAL: PAINLEVEL_OUTOF10: 1

## 2025-03-18 ASSESSMENT — PAIN - FUNCTIONAL ASSESSMENT: PAIN_FUNCTIONAL_ASSESSMENT: 0-10

## 2025-03-18 NOTE — PROGRESS NOTES
Physical Therapy Progress Note    Patient Name: Jeff Gan  MRN: [id number]  Today's Date: 3/18/2025  Time Calculation  Start Time: 0832  Stop Time: 0917  Time Calculation (min): 45 min  PT Therapeutic Procedures Time Entry  Neuromuscular Re-Education Time Entry: 9  Therapeutic Exercise Time Entry: 35       Current Problem  Problem List Items Addressed This Visit             ICD-10-CM       Musculoskeletal and Injuries    Acute pain of left shoulder M25.512    Pain, neck - Primary M54.2       Insurance:  Number of Treatments Authorized: 7/60 (12 under current POC)          Subjective   General  Reason for Referral: L shld pain, L sided neck pain  Referred By: NAIMA Jc  Past Medical History Relevant to Rehab: high cholesterol  General Comment: Pt reports he's been getting more stiff the past week. Not getting much pain - still getting twinges occasionally but not pinching pain. Feels like he knows what to do and will schedule in a few weeks if needed and/or see MD.    Performing HEP?: Yes    Precautions  Precautions  STEADI Fall Risk Score (The score of 4 or more indicates an increased risk of falling): 2  Precautions Comment: none  Pain  Pain Assessment: 0-10  0-10 (Numeric) Pain Score: 1  Pain Type: Acute pain  Pain Location: Shoulder  Pain Orientation: Left    Objective   Cervical AROM in degrees:  Flexion 53  Extension 66  Lateral flexion (R,L) 35, 28  Rotation (R,L) 68, 65     Shld AROM (degrees)    Flexion (R,L) 160, 155    Ext (R,L) 74, 66    Abduction (R,L) 160, 155    ER behind head (R,L) T3, T2    IR behind back (R,L) T3, T6      L Flexion 4+/5      Abduction 5/5       ER at side 5/5        IR at side 5/5     Outcome Measures:  Other Measures  Disability of Arm Shoulder Hand (DASH): 18.18  Neck Disability Index: 8%    Treatments:  Therapeutic Exercise  Therapeutic Exercise Activity 1: OTD pulleys shd flexion x 3 min  Therapeutic Exercise Activity 2: OTD pulleys shd abduction x 3 min  Therapeutic  "Exercise Activity 3: AROM/MMT above  Therapeutic Exercise Activity 4: L lev scap stretch 20\" x 3  Therapeutic Exercise Activity 5: L UT stretch 30\" x 2  Therapeutic Exercise Activity 6: Matrix B rows 12.5# each 30 sec x 2  Therapeutic Exercise Activity 7: Matrix B shld extension 7.5#/7.5# 30 sec x 2  Therapeutic Exercise Activity 8: Matrix B posterior fly 2.5#/2.5# x 1 min  Therapeutic Exercise Activity 9: Matrix B face pulls 5#/5# 40 sec, 20 sec  Therapeutic Exercise Activity 10: Matrix serratus punch out 5# x 1 min  Therapeutic Exercise Activity 11: R/L shld crawl up wall red swiss ball x 1 min  Therapeutic Exercise Activity 12: L shld flexion wall slides x 1 min  Therapeutic Exercise Activity 13: L shld scaption wall slides x 1 min  Therapeutic Exercise Activity 14: L shld abduction wall slides x 30 sec    Balance/Neuromuscular Re-Education  Balance/Neuromuscular Re-Education Activity 1: Desk set up for optimal posture - eyes forward - monitors    OP EDUCATION:  Outpatient Education  Education Comment: Access Code: [email or web address]  URL: [email or web address]  Date: 03/18/2025  Prepared by: Tonya Bell    Exercises  - Seated Cervical Retraction  - 2 x daily - 7 x weekly - 1 sets - 10 reps  - Gentle Levator Scapulae Stretch  - 1 x daily - 7 x weekly - 1 sets - 3 reps - 15-20 sec hold  - Seated Upper Trapezius Stretch  - 1 x daily - 7 x weekly - 1 sets - 2-3 reps - 15-20 hold  - Supine Shoulder Flexion with Free Weight  - 1 x daily - 7 x weekly - 1 sets - 5-8 reps - 5-15 sec hold  - Shoulder Flexion Wall Slide with Towel  - 1 x daily - 7 x weekly - 1 sets - 15 reps  - Standing Shoulder Scaption  - 1 x daily - 7 x weekly - 2 sets - 10 reps  - Standing Shoulder Row with Anchored Resistance  - 1 x daily - 3-5 x weekly - 2 sets - 10 reps  - Shoulder extension with resistance - Neutral  - 1 x daily - 3-5 x weekly - 2 sets - 10 reps  - Face Pulls  - 1 x daily - 3-5 x weekly - 2 sets - 10 reps  - Shoulder " External Rotation with Anchored Resistance  - 1 x daily - 3-5 x weekly - 2 sets - 10 reps  - Shoulder Internal Rotation with Resistance  - 1 x daily - 3-5 x weekly - 2 sets - 10 reps    Assessment:   Patient is 49 year old evaluated and treated x 19 visits to physical therapy with signs and symptoms consistent with L shoulder pain and subsequent L cervicalgia. Cervicalgia has significant improved but persists at times, likely d/t work postures. Patient has shown improved shld ROM and overall strength, however continues with stiffness at end ranges of elevation . Updated HEP with most important exercises - very compliant. Educated on follow up with sports med ortho MD for possible imaging and/or additional tx. May return to PT if needed.     Plan:  OP PT Plan  Treatment/Interventions: Cryotherapy, Dry needling, Education/ Instruction, Electrical stimulation, Gait training, Hot pack, Manual therapy, Neuromuscular re-education, Self care/ home management, Taping techniques, Therapeutic activities, Therapeutic exercises, Vasopneumatic device  PT Plan: Skilled PT (**may be last visit pending progress)  PT Frequency: 1 time per week (*updated this visit)  Duration: 6-8 more weeks; reducing frequency as goals are met  Onset Date: 08/14/24  Number of Treatments Authorized: 7/60 (12 under current POC)  Rehab Potential: Good  Plan of Care Agreement: Patient    Goals:  Active       L shld pain/cervicalgia       STG/LTG (Progressing)       Start:  10/01/24    Expected End:  04/29/25       STG  1) Patient will improve Quick Dash score by 8% to show an improvement in function in 4 weeks. MET  2) Patient will show an improvement in shoulder flex/abd to 160 AROM without pain in order to allow for improved ability to perform household chores in 4 weeks. PART MET   3) Patient will be able to perform ADLs without pain exceeding 3/10 on the VAS in 4 weeks. MET  4) Patient will be independent with HEP in 3 visits in order to allow for  improved function with home and community tasks. MET  LTG  1) Patient will improve Quick Dash score to </=15% to show an improvement in function in 8 weeks. IN PROGRESS   2) Patient will improve scapular and shld strength to 4+/5 in order to reduce compensatory guarding in upper trapezius and greater functional use of upper extremities in 8 weeks. MET  3) Patient will be able to perform ADLs without pain exceeding 2/10 on the VAS in 8 weeks. MAJORITY MET  4) Patient will return to golf/exercise without deficits to improve QOL in 8 weeks. PART MET                 Tonya Bell, PT

## 2025-05-29 ENCOUNTER — DOCUMENTATION (OUTPATIENT)
Dept: PHYSICAL THERAPY | Facility: CLINIC | Age: 50
End: 2025-05-29
Payer: COMMERCIAL

## 2025-05-29 NOTE — PROGRESS NOTES
Discharge Summary    Name: Jeff Gan  MRN: [id number]  : 1975  Date: 25    Discharge Summary: PT    Discharge Information: Date of discharge 25    Therapy Summary: Patient is 49 year old evaluated and treated x 19 visits to physical therapy with signs and symptoms consistent with L shoulder pain and subsequent L cervicalgia. Cervicalgia has significant improved but persists at times, likely d/t work postures. Patient has shown improved shld ROM and overall strength, however continues with stiffness at end ranges of elevation . Updated HEP with most important exercises - very compliant.      Discharge Status: Majority of goals met     Rehab Discharge Reason: Achieved all and/or the most significant goals(s)

## 2025-06-06 ENCOUNTER — OFFICE VISIT (OUTPATIENT)
Dept: URGENT CARE | Age: 50
End: 2025-06-06
Payer: COMMERCIAL

## 2025-06-06 VITALS
OXYGEN SATURATION: 98 % | HEART RATE: 77 BPM | RESPIRATION RATE: 20 BRPM | TEMPERATURE: 98.7 F | DIASTOLIC BLOOD PRESSURE: 77 MMHG | SYSTOLIC BLOOD PRESSURE: 108 MMHG
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
## 2025-06-06 NOTE — PROGRESS NOTES
Subjective   Patient ID: Jeff Gan is a 50 y.o. male. They present today with a chief complaint of Insect Bite (Patient is unsure what has bitten him, red and sswollen on right ankle with black dot in center).    History of Present Illness  Notes a small red swollen area on his right foot after being bitten by something while on vacation 1 week ago. Believes it was a mosquito, did not see an insect. No close contacts with similar rash. States the redness has become more faint. No pain. Occasionally itchy. No drainage. Has not applied medication.          Past Medical History  Allergies as of 06/06/2025 - Reviewed 06/06/2025   Allergen Reaction Noted    Cefuroxime axetil Unknown 06/06/2025       Prescriptions Prior to Admission[1]     Medical History[2]    Surgical History[3]     reports that he has never smoked. He has never used smokeless tobacco.    Review of Systems  Pertinent systems reviewed and were negative unless otherwise stated in HPI.    Objective    Vitals:    06/06/25 1612   BP: 108/77   BP Location: Left arm   Patient Position: Sitting   BP Cuff Size: Adult   Pulse: 77   Resp: 20   Temp: 37.1 °C (98.7 °F)   TempSrc: Oral   SpO2: 98%     No LMP for male patient.    Physical Exam  Constitutional:       General: He is not in acute distress.  Musculoskeletal:      Right lower leg: Normal.      Right ankle: Normal.      Right foot: Normal. Normal range of motion. No swelling or tenderness. Normal pulse.   Skin:     Comments: Single nontender indurated papule to right proximal lateral foot without drainage or fluctuance. No streaking. Tiny scabbed excoriation atop the papule         Diagnostic study results (if any) were reviewed by Vernon Judd PA-C.    Assessment/Plan   Allergies, medications, history, and pertinent labs/EKGs/imaging reviewed by Vernon Judd PA-C.     Medical Decision Making  Low concern for cellulitis. Most consistent with localized allergic reaction. Low concern for  scabies or sand fleas    Orders and Diagnoses  Diagnoses and all orders for this visit:  Bug bite, initial encounter      Medical Admin Record    Disposition: Home    Electronically signed by Vernon Judd PA-C         [1] (Not in a hospital admission)   [2] History reviewed. No pertinent past medical history.  [3] History reviewed. No pertinent surgical history.

## 2025-06-06 NOTE — PATIENT INSTRUCTIONS
Apply Neosporin as needed. Apply a cool compress as needed.    Monitor for signs of infection: Worsening redness, swelling, pain, drainage and return if these develop